# Patient Record
Sex: FEMALE | Race: WHITE | Employment: FULL TIME | URBAN - METROPOLITAN AREA
[De-identification: names, ages, dates, MRNs, and addresses within clinical notes are randomized per-mention and may not be internally consistent; named-entity substitution may affect disease eponyms.]

---

## 2017-05-01 ENCOUNTER — TELEPHONE (OUTPATIENT)
Dept: OBGYN | Facility: CLINIC | Age: 32
End: 2017-05-01

## 2017-05-02 NOTE — TELEPHONE ENCOUNTER
Pt would like to est first pregnancy care. Please advise.     Thank You,    Central Scheduler  Cheyenne ROSADO

## 2017-05-02 NOTE — TELEPHONE ENCOUNTER
Cha, can you call this pt and help her make NPN Nurse and md appts (if she is under 24 weeks pregnant).     Thanks.   Lety

## 2017-05-24 ENCOUNTER — PRENATAL OFFICE VISIT (OUTPATIENT)
Dept: FAMILY MEDICINE | Facility: CLINIC | Age: 32
End: 2017-05-24
Payer: COMMERCIAL

## 2017-05-24 ENCOUNTER — PRENATAL OFFICE VISIT (OUTPATIENT)
Dept: NURSING | Facility: CLINIC | Age: 32
End: 2017-05-24
Payer: COMMERCIAL

## 2017-05-24 VITALS
RESPIRATION RATE: 16 BRPM | OXYGEN SATURATION: 95 % | SYSTOLIC BLOOD PRESSURE: 104 MMHG | HEART RATE: 83 BPM | WEIGHT: 180.9 LBS | DIASTOLIC BLOOD PRESSURE: 73 MMHG | BODY MASS INDEX: 31.05 KG/M2

## 2017-05-24 VITALS
HEART RATE: 83 BPM | RESPIRATION RATE: 16 BRPM | TEMPERATURE: 97.6 F | HEIGHT: 64 IN | SYSTOLIC BLOOD PRESSURE: 104 MMHG | BODY MASS INDEX: 30.88 KG/M2 | WEIGHT: 180.9 LBS | DIASTOLIC BLOOD PRESSURE: 73 MMHG

## 2017-05-24 DIAGNOSIS — N91.2 AMENORRHEA: Primary | ICD-10-CM

## 2017-05-24 DIAGNOSIS — A60.00 GENITAL HERPES SIMPLEX, UNSPECIFIED SITE: ICD-10-CM

## 2017-05-24 DIAGNOSIS — Z34.01 PREGNANCY, FIRST, OBSTETRICAL CARE, FIRST TRIMESTER: Primary | ICD-10-CM

## 2017-05-24 LAB
ALBUMIN UR-MCNC: NEGATIVE MG/DL
APPEARANCE UR: CLEAR
BETA HCG QUAL IFA URINE: POSITIVE
BILIRUB UR QL STRIP: NEGATIVE
COLOR UR AUTO: YELLOW
GLUCOSE UR STRIP-MCNC: NEGATIVE MG/DL
HGB UR QL STRIP: NEGATIVE
KETONES UR STRIP-MCNC: NEGATIVE MG/DL
LEUKOCYTE ESTERASE UR QL STRIP: NEGATIVE
NITRATE UR QL: NEGATIVE
PH UR STRIP: 6.5 PH (ref 5–7)
SP GR UR STRIP: 1.02 (ref 1–1.03)
URN SPEC COLLECT METH UR: NORMAL
UROBILINOGEN UR STRIP-ACNC: 0.2 EU/DL (ref 0.2–1)

## 2017-05-24 PROCEDURE — 87340 HEPATITIS B SURFACE AG IA: CPT | Performed by: FAMILY MEDICINE

## 2017-05-24 PROCEDURE — 87086 URINE CULTURE/COLONY COUNT: CPT | Performed by: FAMILY MEDICINE

## 2017-05-24 PROCEDURE — 86762 RUBELLA ANTIBODY: CPT | Performed by: FAMILY MEDICINE

## 2017-05-24 PROCEDURE — 36415 COLL VENOUS BLD VENIPUNCTURE: CPT | Performed by: FAMILY MEDICINE

## 2017-05-24 PROCEDURE — 87389 HIV-1 AG W/HIV-1&-2 AB AG IA: CPT | Performed by: FAMILY MEDICINE

## 2017-05-24 PROCEDURE — 81003 URINALYSIS AUTO W/O SCOPE: CPT | Performed by: FAMILY MEDICINE

## 2017-05-24 PROCEDURE — 99207 ZZC FIRST OB VISIT: CPT | Performed by: FAMILY MEDICINE

## 2017-05-24 PROCEDURE — 86900 BLOOD TYPING SEROLOGIC ABO: CPT | Performed by: FAMILY MEDICINE

## 2017-05-24 PROCEDURE — 84703 CHORIONIC GONADOTROPIN ASSAY: CPT | Performed by: FAMILY MEDICINE

## 2017-05-24 PROCEDURE — 87591 N.GONORRHOEAE DNA AMP PROB: CPT | Performed by: FAMILY MEDICINE

## 2017-05-24 PROCEDURE — 87491 CHLMYD TRACH DNA AMP PROBE: CPT | Performed by: FAMILY MEDICINE

## 2017-05-24 PROCEDURE — 86901 BLOOD TYPING SEROLOGIC RH(D): CPT | Performed by: FAMILY MEDICINE

## 2017-05-24 PROCEDURE — 86850 RBC ANTIBODY SCREEN: CPT | Performed by: FAMILY MEDICINE

## 2017-05-24 PROCEDURE — 85027 COMPLETE CBC AUTOMATED: CPT | Performed by: FAMILY MEDICINE

## 2017-05-24 PROCEDURE — 86780 TREPONEMA PALLIDUM: CPT | Performed by: FAMILY MEDICINE

## 2017-05-24 PROCEDURE — 99207 ZZC NO CHARGE NURSE ONLY: CPT

## 2017-05-24 RX ORDER — CETIRIZINE HYDROCHLORIDE 10 MG/1
10 TABLET ORAL EVERY EVENING
Qty: 30 TABLET | Refills: 1 | COMMUNITY
Start: 2017-05-24

## 2017-05-24 RX ORDER — FLUTICASONE PROPIONATE 50 MCG
1-2 SPRAY, SUSPENSION (ML) NASAL DAILY
Qty: 3 BOTTLE | Refills: 11 | COMMUNITY
Start: 2017-05-24 | End: 2023-05-08

## 2017-05-24 RX ORDER — PRENATAL VIT/IRON FUM/FOLIC AC 27MG-0.8MG
1 TABLET ORAL DAILY
Qty: 100 TABLET | Refills: 3 | COMMUNITY
Start: 2017-05-24 | End: 2023-05-08

## 2017-05-24 RX ORDER — SERTRALINE HYDROCHLORIDE 100 MG/1
50 TABLET, FILM COATED ORAL DAILY
Qty: 90 TABLET | Refills: 1 | COMMUNITY
Start: 2017-05-24 | End: 2023-05-08

## 2017-05-24 RX ORDER — RIZATRIPTAN BENZOATE 5 MG/1
5-10 TABLET ORAL
Qty: 18 TABLET | Refills: 3 | COMMUNITY
Start: 2017-05-24 | End: 2017-05-24 | Stop reason: ALTCHOICE

## 2017-05-24 NOTE — PROGRESS NOTES
Subjective:  Luba Vargas is a 31 year old female  who presents today for her first prenatal visit.  She has never had an abnormal PAP smear.  Her LMP was 12/21/17.  She has had trouble with nausea.  This is her 1st pregnancy.  She is a G 1 P 0.    Her EDC is 12/21/17.  She has the following questions:  Wondering about herpes and need for C section.   Has question about sertraline.       Current Outpatient Prescriptions   Medication Sig Dispense Refill     cetirizine (ZYRTEC) 10 MG tablet Take 1 tablet (10 mg) by mouth every evening 30 tablet 1     Prenatal Vit-Fe Fumarate-FA (PRENATAL MULTIVITAMIN  PLUS IRON) 27-0.8 MG TABS per tablet Take 1 tablet by mouth daily 100 tablet 3     fluticasone (FLONASE) 50 MCG/ACT spray Spray 1-2 sprays into both nostrils daily 3 Bottle 11     sertraline (ZOLOFT) 100 MG tablet Take 1 tablet (100 mg) by mouth daily 90 tablet 1     albuterol 90 MCG/ACT inhaler Inhale 2 puffs into the lungs every 4 hours as needed.         Past Medical History:   Diagnosis Date     Abnormal Pap smear of cervix 2011    MODERATE DYSPLASIA OF CERVIX     Asthma      Kidney stones 2011 and 2012     Major depressive disorder      Migraine      Varicella without mention of complication     MUMPS ON 2007       Past Surgical History:   Procedure Laterality Date     CONIZATION LEEP  10/25/2011    Procedure:CONIZATION LEEP; Conization Loop Electrosurgical Excision, Laser to Left Vaginal Fornix (VB Rags); Surgeon:LYNN KHAN; Location:[facility]     LASER CO2 VAGINA  10/25/2011    Procedure:LASER CO2 VAGINA; Surgeon:LYNN KHAN; Location:[facility]     MYRINGOTOMY, INSERT TUBE, COMBINED Right 10/2016    2 SETS     TONSILLECTOMY      AT 12 Y/O       Family History   Problem Relation Age of Onset     DIABETES Mother      Thyroid Disease Father      Thyroid Disease Sister      DIABETES Maternal Grandmother      Lung Cancer Maternal Grandfather        Social History   Substance Use Topics     Smoking status:  "Never Smoker     Smokeless tobacco: Not on file     Alcohol use No      Comment: 1 DRINK PER week when not pregnant       Objective:  Blood pressure 104/73, pulse 83, temperature 97.6  F (36.4  C), temperature source Oral, resp. rate 16, height 5' 4\" (1.626 m), weight 180 lb 14.4 oz (82.1 kg), last menstrual period 03/16/2017.  General appearance: Healthy.  Mental Status: cooperative, normal affect, no gross thought process defects.  HEENT:   Ears- Normal external canals and TMs  Eyes- PERRL, EOM's intact, fundi benign, sharp disc margins.  Throat- Normal  Nodes- Negative  Thyroid: Normal to palpation, no enlargement or nodules noted.  Breasts: Symmetric without mass tenderness or discharge.  Axillary nodes negative.  Lungs: Clear to auscultation bilaterally  Heart.: Normal rate and rhythm.  No murmurs, clicks or gallops.  Pulses:    R-4/4, PT-4/4, DP-4/4  Abdomen: BS active. Soft, non-tender, no masses or organomegaly.  Aorta normal. No bruits.   bimanual exam  8 week size uterus, no adenexal mass or tenderness.  Doptone NONE.  Extremities: Normal without edema  Reflexes:  Symmetric bilaterally and 2 + at the patella  Skin: Clear and free of rash.    Assessment:  1. Luba Vargas is a healthy 31 year old female here for a first prenatal visit.  2. Patient and her  desire first trimester screening  3. Patient with hx of genital herpes - NO outbreak for over 3 years  4. Allergies - may continue flonase use  5. Depression - will have her taper down on her dose of sertraline        Plan:  1. A Pap smear was NOT obtained  2. Prenatal labs ordered - see epicare orders  3. First trimester screening ordered through [facility]  4. fetal anatomy survey to be done around 20 weeks gestation  5. .will come down to 75 mg of sertraline daily for a few months and then 50 mg per day.   May try to come off sertraline for 3rd trimester but will follow for sx of depression  6. May use flonase for seasonal allergies  7.  Patient " is to follow-up in 4 weeks and as needed.

## 2017-05-24 NOTE — MR AVS SNAPSHOT
"              After Visit Summary   2017    Luba Vargas    MRN: [id number]           Patient Information     Date Of Birth          1985        Visit Information        Provider Department      2017 2:00 PM CR OB CLINIC [facility]        Today's Diagnoses     Amenorrhea    -  1       Follow-ups after your visit        Who to contact     If you have questions or need follow up information about today's clinic visit or your schedule please contact [facility] directly at [phone].  Normal or non-critical lab and imaging results will be communicated to you by MyChart, letter or phone within 4 business days after the clinic has received the results. If you do not hear from us within 7 days, please contact the clinic through [employer] or phone. If you have a critical or abnormal lab result, we will notify you by phone as soon as possible.  Submit refill requests through [employer] or call your pharmacy and they will forward the refill request to us. Please allow 3 business days for your refill to be completed.          Additional Information About Your Visit        MyChart Information     [employer] lets you send messages to your doctor, view your test results, renew your prescriptions, schedule appointments and more. To sign up, go to [email or web address] . Click on \"Log in\" on the left side of the screen, which will take you to the Welcome page. Then click on \"Sign up Now\" on the right side of the page.     You will be asked to enter the access code listed below, as well as some personal information. Please follow the directions to create your username and password.     Your access code is: [email or web address]  Expires: 2017  3:26 PM     Your access code will  in 90 days. If you need help or a new code, please call your [facility] or [phone].        Care EveryWhere ID     This is your Care EveryWhere ID. This could be used by other organizations to " access your [place] medical records  [id number]        Your Vitals Were     Pulse Respirations Last Period Pulse Oximetry BMI (Body Mass Index)       83 16 03/16/2017 (Exact Date) 95% 31.05 kg/m2        Blood Pressure from Last 3 Encounters:   05/24/17 104/73   05/24/17 104/73   10/25/11 113/69    Weight from Last 3 Encounters:   05/24/17 180 lb 14.4 oz (82.1 kg)   05/24/17 180 lb 14.4 oz (82.1 kg)   10/25/11 165 lb 9.1 oz (75.1 kg)              We Performed the Following     Beta HCG qual IFA urine        Primary Care Provider Office Phone # Fax #    Heide Blackman [phone]       [facility] [address]  [place] [address]        Thank you!     Thank you for choosing [facility]  for your care. Our goal is always to provide you with excellent care. Hearing back from our patients is one way we can continue to improve our services. Please take a few minutes to complete the written survey that you may receive in the mail after your visit with us. Thank you!             Your Updated Medication List - Protect others around you: Learn how to safely use, store and throw away your medicines at www.disposemymeds.org.          This list is accurate as of: 5/24/17  3:26 PM.  Always use your most recent med list.                   Brand Name Dispense Instructions for use    albuterol 90 MCG/ACT inhaler      Inhale 2 puffs into the lungs every 4 hours as needed.       cetirizine 10 MG tablet    zyrTEC    30 tablet    Take 1 tablet (10 mg) by mouth every evening       fluticasone 50 MCG/ACT spray    FLONASE    3 Bottle    Spray 1-2 sprays into both nostrils daily       MAXALT 5 MG tablet   Generic drug:  rizatriptan     18 tablet    Take 1-2 tablets (5-10 mg) by mouth at onset of headache for migraine May repeat in 2 hours. Max 6 tablets/24 hours.       prenatal multivitamin  plus iron 27-0.8 MG Tabs per tablet     100 tablet    Take 1 tablet by mouth daily        sertraline 100 MG tablet    ZOLOFT    90 tablet    Take 1 tablet (100 mg) by mouth daily

## 2017-05-24 NOTE — MR AVS SNAPSHOT
After Visit Summary   5/24/2017    Luba Vargas    MRN: [id number]           Patient Information     Date Of Birth          1985        Visit Information        Provider Department      5/24/2017 2:45 PM Landy Browning MD [facility]        Today's Diagnoses     Pregnancy, first, obstetrical care, first trimester    -  1    Genital herpes simplex, unspecified site          Care Instructions        Fetal development begins soon after conception. Find out how your baby grows and develops during the first trimester.    You're pregnant. Congratulations! You'll undoubtedly spend the months ahead wondering how your baby is growing and developing. What does your baby look like? How big is he or she? When will you feel the first kick?  Fetal development typically follows a predictable course. Find out what happens during the first trimester by checking out this weekly calendar of events. Keep in mind that measurements are approximate.  It might seem strange, but you're not actually pregnant the first week or two of the time allotted to your pregnancy. Yes, you read that correctly!  Conception typically occurs about two weeks after your last period begins. To calculate your due date, your health care provider will count ahead 40 weeks from the start of your last period. This means your period is counted as part of your pregnancy -- even though you weren't pregnant at the time.  The sperm and egg unite in one of your fallopian tubes to form a one-celled entity called a zygote. If more than one egg is released and fertilized, you might have multiple zygotes.  The zygote typically has 46 chromosomes -- 23 from you and 23 from the father. These chromosomes help determine your baby's sex, traits such as eye and hair color, and, to some extent, personality and intelligence.  Soon after fertilization, the zygote travels down the fallopian tube toward the uterus. At the same time, it will  begin dividing to form a cluster of cells resembling a tiny raspberry -- a morula.  By the time it reaches the uterus, the rapidly dividing ball of cells -- now known as a blastocyst -- has  into two sections.  The inner group of cells will become the embryo. The outer group will become the cells that nourish and protect it. On contact, the blastocyst will burrow into the uterine wall for nourishment. This process is called implantation.  The placenta, which will nourish your baby throughout the pregnancy, also begins to form.   The fifth week of pregnancy, or the third week after conception, marks the beginning of the embryonic period. This is when the baby's brain, spinal cord, heart and other organs begin to form.  The embryo is now made of three layers. The top layer -- the ectoderm -- will give rise to your baby's outermost layer of skin, central and peripheral nervous systems, eyes, inner ears, and many connective tissues.  Your baby's heart and a primitive circulatory system will form in the middle layer of cells -- the mesoderm. This layer of cells will also serve as the foundation for your baby's bones, muscles, kidneys and much of the reproductive system.  The inner layer of cells -- the endoderm -- will become a simple tube lined with mucous membranes. Your baby's lungs, intestines and bladder will develop here.  By the end of this week, your baby is likely about the size of the tip of a pen.  Growth is rapid this week. Just four weeks after conception, the neural tube along your baby's back is closing and your baby's heart is pumping blood.  Basic facial features will begin to appear, including passageways that will make up the inner ears and arches that will contribute to the jaw. Your baby's body begins to take on a C-shaped curvature. Small buds will soon become arms and legs.  Seven weeks into your pregnancy, or five weeks after conception, your baby's brain and face are rapidly developing.  Tiny nostrils become visible, and the eye lenses begin to form. The arm buds that sprouted last week now take on the shape of paddles.  By the end of this week, your baby might be a little bigger than the top of a pencil eraser.  Eight weeks into your pregnancy, or six weeks after conception, your baby's arms and legs are growing longer, and fingers have begun to form. The shell-shaped parts of your baby's ears also are forming, and your baby's eyes are visible. The upper lip and nose have formed. The trunk of your baby's body is beginning to straighten.  By the end of this week, your baby might be about 1/2 inch (11 to 14 millimeters) long.  In the ninth week of pregnancy, or seven weeks after conception, your baby's arms grow, develop bones and bend at the elbows. Toes form, and your baby's eyelids and ears continue developing.   By the end of this week, your baby might be about 3/4 inch (20 millimeters) long.   By the 10th week of pregnancy, or eight weeks after conception, your baby's head has become more round. The neck begins to develop, and your baby's eyelids begin to close to protect his or her developing eyes.   At the beginning of the 11th week of pregnancy, or the ninth week after conception, your baby's head still makes up about half of its length. However, your baby's body is about to catch up, growing rapidly in the coming weeks.  Your baby is now officially described as a fetus. This week your baby's eyes are widely , the eyelids fused and the ears low set. Red blood cells are beginning to form in your baby's liver. By the end of this week, your baby's external genitalia will start developing into a penis or clitoris and labia majora.  By now your baby might measure about 2 inches (50 millimeters) long from crown to rump and weigh almost 1/3 ounce (8 grams).  Twelve weeks into your pregnancy, or 10 weeks after conception, your baby is developing fingernails. Your baby's face now has a human  profile.  By now your baby might be about 2 1/2 inches (60 millimeters) long from crown to rump and weigh about 1/2 ounce (14 grams).            Follow-ups after your visit        Additional Services     MAT FETAL MED CTR REFERRAL-PREGNANCY       >> Patient may proceed with recommendations for further testing as directed by the Maternal Fetal Medicine Specialist >>    >> If requesting Fetal Echo: MFM will determine appropriate location for exam due to indication.    >> If requesting Lung Maturity Amnio:  If results indicate fetal lung maturity, induction or C/S is recommended within 36 hours.  Please schedule accordingly.     Dear Patient:   Please be aware that coverage of these services is subject to the terms and limitations of your health insurance plan.  Call member services at your health plan with any benefit or coverage questions.      Please bring the following to your appointment:    >>  Any x-rays, CTs or MRIs which have been performed.  Contact the facility where they were done to arrange for  prior to your scheduled appointment.  Any new CT, MRI or other procedures ordered by your specialist must be performed at a [place] facility or coordinated by your clinic's referral office.  >>  List of current medications   >>  This referral request   >>  Any documents/labs given to you for this referral                  Follow-up notes from your care team     Return in about 4 weeks (around 6/21/2017).      Future tests that were ordered for you today     Open Future Orders        Priority Expected Expires Ordered    US OB > 14 Weeks Complete Single Routine  5/24/2018 5/24/2017            Who to contact     If you have questions or need follow up information about today's clinic visit or your schedule please contact [facility] directly at [phone].  Normal or non-critical lab and imaging results will be communicated to you by MyChart, letter or phone within 4 business days after the  "clinic has received the results. If you do not hear from us within 7 days, please contact the clinic through [facility] or phone. If you have a critical or abnormal lab result, we will notify you by phone as soon as possible.  Submit refill requests through [employer] or call your pharmacy and they will forward the refill request to us. Please allow 3 business days for your refill to be completed.          Additional Information About Your Visit        [employer] Information     [facility] lets you send messages to your doctor, view your test results, renew your prescriptions, schedule appointments and more. To sign up, go to [email or web address] . Click on \"Log in\" on the left side of the screen, which will take you to the Welcome page. Then click on \"Sign up Now\" on the right side of the page.     You will be asked to enter the access code listed below, as well as some personal information. Please follow the directions to create your username and password.     Your access code is: [email or web address]  Expires: 2017  3:26 PM     Your access code will  in 90 days. If you need help or a new code, please call your [facility] clinic or [phone].        Care EveryWhere ID     This is your Care EveryWhere ID. This could be used by other organizations to access your [facility] medical records  [id number]        Your Vitals Were     Pulse Temperature Respirations Height Last Period BMI (Body Mass Index)    83 97.6  F (36.4  C) (Oral) 16 5' 4\" (1.626 m) 2017 (Exact Date) 31.05 kg/m2       Blood Pressure from Last 3 Encounters:   17 104/73   17 104/73   10/25/11 113/69    Weight from Last 3 Encounters:   17 180 lb 14.4 oz (82.1 kg)   17 180 lb 14.4 oz (82.1 kg)   10/25/11 165 lb 9.1 oz (75.1 kg)              We Performed the Following     *UA reflex to Microscopic     ABO/Rh type and screen     Anti Treponema     CBC with platelets     Chlamydia trachomatis PCR     Hepatitis B surface antigen     " HIV Antigen Antibody Combo     MAT FETAL MED CTR REFERRAL-PREGNANCY     Neisseria gonorrhoeae PCR     Rubella Antibody IgG Quantitative     Urine Culture Aerobic Bacterial          Today's Medication Changes          These changes are accurate as of: 5/24/17  4:02 PM.  If you have any questions, ask your nurse or doctor.               Stop taking these medicines if you haven't already. Please contact your care team if you have questions.     MAXALT 5 MG tablet   Generic drug:  rizatriptan   Stopped by:  Landy Browning MD                    Primary Care Provider Office Phone # Fax #    Heide Blackman [phone]       [facility] [address]  [facility] [address]        Thank you!     Thank you for choosing [facility]  for your care. Our goal is always to provide you with excellent care. Hearing back from our patients is one way we can continue to improve our services. Please take a few minutes to complete the written survey that you may receive in the mail after your visit with us. Thank you!             Your Updated Medication List - Protect others around you: Learn how to safely use, store and throw away your medicines at www.disposemymeds.org.          This list is accurate as of: 5/24/17  4:02 PM.  Always use your most recent med list.                   Brand Name Dispense Instructions for use    albuterol 90 MCG/ACT inhaler      Inhale 2 puffs into the lungs every 4 hours as needed.       cetirizine 10 MG tablet    zyrTEC    30 tablet    Take 1 tablet (10 mg) by mouth every evening       fluticasone 50 MCG/ACT spray    FLONASE    3 Bottle    Spray 1-2 sprays into both nostrils daily       prenatal multivitamin  plus iron 27-0.8 MG Tabs per tablet     100 tablet    Take 1 tablet by mouth daily       sertraline 100 MG tablet    ZOLOFT    90 tablet    Take 1 tablet (100 mg) by mouth daily

## 2017-05-24 NOTE — PATIENT INSTRUCTIONS
Fetal development begins soon after conception. Find out how your baby grows and develops during the first trimester.    You're pregnant. Congratulations! You'll undoubtedly spend the months ahead wondering how your baby is growing and developing. What does your baby look like? How big is he or she? When will you feel the first kick?  Fetal development typically follows a predictable course. Find out what happens during the first trimester by checking out this weekly calendar of events. Keep in mind that measurements are approximate.  It might seem strange, but you're not actually pregnant the first week or two of the time allotted to your pregnancy. Yes, you read that correctly!  Conception typically occurs about two weeks after your last period begins. To calculate your due date, your health care provider will count ahead 40 weeks from the start of your last period. This means your period is counted as part of your pregnancy -- even though you weren't pregnant at the time.  The sperm and egg unite in one of your fallopian tubes to form a one-celled entity called a zygote. If more than one egg is released and fertilized, you might have multiple zygotes.  The zygote typically has 46 chromosomes -- 23 from you and 23 from the father. These chromosomes help determine your baby's sex, traits such as eye and hair color, and, to some extent, personality and intelligence.  Soon after fertilization, the zygote travels down the fallopian tube toward the uterus. At the same time, it will begin dividing to form a cluster of cells resembling a tiny raspberry -- a morula.  By the time it reaches the uterus, the rapidly dividing ball of cells -- now known as a blastocyst -- has  into two sections.  The inner group of cells will become the embryo. The outer group will become the cells that nourish and protect it. On contact, the blastocyst will burrow into the uterine wall for nourishment. This process is called  implantation.  The placenta, which will nourish your baby throughout the pregnancy, also begins to form.   The fifth week of pregnancy, or the third week after conception, marks the beginning of the embryonic period. This is when the baby's brain, spinal cord, heart and other organs begin to form.  The embryo is now made of three layers. The top layer -- the ectoderm -- will give rise to your baby's outermost layer of skin, central and peripheral nervous systems, eyes, inner ears, and many connective tissues.  Your baby's heart and a primitive circulatory system will form in the middle layer of cells -- the mesoderm. This layer of cells will also serve as the foundation for your baby's bones, muscles, kidneys and much of the reproductive system.  The inner layer of cells -- the endoderm -- will become a simple tube lined with mucous membranes. Your baby's lungs, intestines and bladder will develop here.  By the end of this week, your baby is likely about the size of the tip of a pen.  Growth is rapid this week. Just four weeks after conception, the neural tube along your baby's back is closing and your baby's heart is pumping blood.  Basic facial features will begin to appear, including passageways that will make up the inner ears and arches that will contribute to the jaw. Your baby's body begins to take on a C-shaped curvature. Small buds will soon become arms and legs.  Seven weeks into your pregnancy, or five weeks after conception, your baby's brain and face are rapidly developing. Tiny nostrils become visible, and the eye lenses begin to form. The arm buds that sprouted last week now take on the shape of paddles.  By the end of this week, your baby might be a little bigger than the top of a pencil eraser.  Eight weeks into your pregnancy, or six weeks after conception, your baby's arms and legs are growing longer, and fingers have begun to form. The shell-shaped parts of your baby's ears also are forming, and  your baby's eyes are visible. The upper lip and nose have formed. The trunk of your baby's body is beginning to straighten.  By the end of this week, your baby might be about 1/2 inch (11 to 14 millimeters) long.  In the ninth week of pregnancy, or seven weeks after conception, your baby's arms grow, develop bones and bend at the elbows. Toes form, and your baby's eyelids and ears continue developing.   By the end of this week, your baby might be about 3/4 inch (20 millimeters) long.   By the 10th week of pregnancy, or eight weeks after conception, your baby's head has become more round. The neck begins to develop, and your baby's eyelids begin to close to protect his or her developing eyes.   At the beginning of the 11th week of pregnancy, or the ninth week after conception, your baby's head still makes up about half of its length. However, your baby's body is about to catch up, growing rapidly in the coming weeks.  Your baby is now officially described as a fetus. This week your baby's eyes are widely , the eyelids fused and the ears low set. Red blood cells are beginning to form in your baby's liver. By the end of this week, your baby's external genitalia will start developing into a penis or clitoris and labia majora.  By now your baby might measure about 2 inches (50 millimeters) long from crown to rump and weigh almost 1/3 ounce (8 grams).  Twelve weeks into your pregnancy, or 10 weeks after conception, your baby is developing fingernails. Your baby's face now has a human profile.  By now your baby might be about 2 1/2 inches (60 millimeters) long from crown to rump and weigh about 1/2 ounce (14 grams).

## 2017-05-24 NOTE — NURSING NOTE
Subjective: 31 year old patient presents for pregnancy confirmation. Her last menstrual period was 3/16/17. She has had a positive home test This is her 1st pregnancy, G1, P0. She has had previous n/a. She would like to be seen Dr. Allen for her prenatal care. She has started prenatal vitamins.      Exam:  General Appearance: appears well.  Her urine pregnancy test is positive.    Assessment: positive pregnancy confirmation.    Plan: Patient has an EDC of 12/21/17. Is currently 9w6d weeks along. She is scheduled for her first OB appointment with Dr. Allen today after this appt. Risk assessment done. We discussed basic pregnancy care, diet, exercise and prenatal vitamins.     Pre Term Labor Risk Assessment   1. Is the patient's age <18 or >40? no  2. Does the patient have a BMI < 18.5? no  3. If previous pregnancy, was delivery within previous 6 months? no  4. Have you ever been diagnosed with pyelonephritis? no  5. Have you ever delivered a baby prior to 37 weeks gestation? no  6. Have you ever been told you have a uterine anomaly? no  7. Do you currently have uterine fibroids? no  8. Have you had any gynecological surgical procedures such as cervical conization, a LEEP procedure, laser treatment or cryosurgery of the cervix? yes  9. Did your mother take ROSA or any other hormones when she was pregnant with you? no  10. Did conception for this pregnancy occur via In Vitro Fertilization? no  11. Are you carrying twins? no  12. Do you currently use tobacco products? no  13. Prior to this pregnancy, how much alcohol did you drink each week? (if >7/week= high risk..)  1 glass of wine  14. Since you learned you were pregnant, how much beer, wine or hard liquor did you drink per week? (anything >0 = high risk) none  15. Prior to learning you are pregnant, did you use any of the following: marijuana, prescription narcotics, speed, cocaine, heroin, hallucinogens or other drugs? (any use within 1 yr = high risk)   no  16. Since you learned you are pregnant, have you used any of the following: marijuana, prescription narcotics, speed, cocaine, heroin, hallucinogens or other drugs? (any use = high risk)  no  17. Do you have a history of chemical dependency (yes=high risk)  no  18. Have you ever been treated for more than 1 Urinary Tract Infection during this pregnancy? no  19. Do you have a history of Depression, Bi-polar disorder, anxiety, schizophrenia or other mental illness?  yes  20. Are you currently being treated for depression, bi-polar disorder, anxiety, schizophrenia or other mental illness? yes  21. Have you had Chlamydia or gonorrhea during this pregnancy? no  22. Periodontal disease (gum disease)? no  23. Has anyone hit, slapped, kicked or otherwise hurt you? no  24. Has anyone forced you to have sex when you didn't want to? no  Summary:   Patient is not high risk for  Labor  Martita Posadas RN

## 2017-05-24 NOTE — NURSING NOTE
"Chief Complaint   Patient presents with     Prenatal Care     9 weeks 6 days, pt thinks she will be 11 weeks tomorrow.        Initial /73 (BP Location: Left arm, Patient Position: Chair, Cuff Size: Adult Regular)  Pulse 83  Temp 97.6  F (36.4  C) (Oral)  Resp 16  Ht 5' 4\" (1.626 m)  Wt 180 lb 14.4 oz (82.1 kg)  LMP 03/16/2017 (Exact Date)  BMI 31.05 kg/m2 Estimated body mass index is 31.05 kg/(m^2) as calculated from the following:    Height as of this encounter: 5' 4\" (1.626 m).    Weight as of this encounter: 180 lb 14.4 oz (82.1 kg).  Medication Reconciliation: complete     Corey Thompson CMA      "

## 2017-05-25 ENCOUNTER — TELEPHONE (OUTPATIENT)
Dept: FAMILY MEDICINE | Facility: CLINIC | Age: 32
End: 2017-05-25

## 2017-05-25 LAB
ABO + RH BLD: NORMAL
ABO + RH BLD: NORMAL
BLD GP AB SCN SERPL QL: NORMAL
BLOOD BANK CMNT PATIENT-IMP: NORMAL
ERYTHROCYTE [DISTWIDTH] IN BLOOD BY AUTOMATED COUNT: 15.3 % (ref 10–15)
HCT VFR BLD AUTO: 38.9 % (ref 35–47)
HGB BLD-MCNC: 13.3 G/DL (ref 11.7–15.7)
MCH RBC QN AUTO: 29.8 PG (ref 26.5–33)
MCHC RBC AUTO-ENTMCNC: 34.2 G/DL (ref 31.5–36.5)
MCV RBC AUTO: 87 FL (ref 78–100)
PLATELET # BLD AUTO: 279 10E9/L (ref 150–450)
RBC # BLD AUTO: 4.47 10E12/L (ref 3.8–5.2)
SPECIMEN EXP DATE BLD: NORMAL
WBC # BLD AUTO: 11.5 10E9/L (ref 4–11)

## 2017-05-25 NOTE — TELEPHONE ENCOUNTER
Roldanxochitl called hospital, reached out to pt to schedule US and pt stated she is going to be getting her prenatal care from other provider  Will not be following up here in [facility]    Anisha Rosa RN, BS  Clinical Nurse Triage.

## 2017-05-26 LAB
BACTERIA SPEC CULT: NORMAL
C TRACH DNA SPEC QL NAA+PROBE: NORMAL
HBV SURFACE AG SERPL QL IA: NONREACTIVE
HIV 1+2 AB+HIV1 P24 AG SERPL QL IA: NORMAL
MICRO REPORT STATUS: NORMAL
N GONORRHOEA DNA SPEC QL NAA+PROBE: NORMAL
RUBV IGG SERPL IA-ACNC: 16 IU/ML
SPECIMEN SOURCE: NORMAL
T PALLIDUM IGG+IGM SER QL: NEGATIVE

## 2017-05-31 PROBLEM — Z67.40 BLOOD TYPE O+: Status: ACTIVE | Noted: 2017-05-31

## 2017-06-13 ENCOUNTER — HOSPITAL ENCOUNTER (EMERGENCY)
Facility: CLINIC | Age: 32
Discharge: HOME | End: 2017-06-13
Attending: EMERGENCY MEDICINE | Admitting: EMERGENCY MEDICINE
Payer: COMMERCIAL

## 2017-06-13 VITALS
DIASTOLIC BLOOD PRESSURE: 58 MMHG | TEMPERATURE: 98 F | HEART RATE: 91 BPM | RESPIRATION RATE: 18 BRPM | SYSTOLIC BLOOD PRESSURE: 122 MMHG | OXYGEN SATURATION: 99 %

## 2017-06-13 DIAGNOSIS — G43.809 OTHER MIGRAINE WITHOUT STATUS MIGRAINOSUS, NOT INTRACTABLE: ICD-10-CM

## 2017-06-13 DIAGNOSIS — Z3A.11 11 WEEKS GESTATION OF PREGNANCY: ICD-10-CM

## 2017-06-13 LAB
ANION GAP SERPL CALCULATED.3IONS-SCNC: 9 MMOL/L (ref 3–14)
BUN SERPL-MCNC: 8 MG/DL (ref 7–30)
CALCIUM SERPL-MCNC: 8.8 MG/DL (ref 8.5–10.1)
CHLORIDE SERPL-SCNC: 103 MMOL/L (ref 94–109)
CO2 SERPL-SCNC: 24 MMOL/L (ref 20–32)
CREAT SERPL-MCNC: 0.51 MG/DL (ref 0.52–1.04)
GFR SERPL CREATININE-BSD FRML MDRD: ABNORMAL ML/MIN/1.7M2
GLUCOSE SERPL-MCNC: 87 MG/DL (ref 70–99)
POTASSIUM SERPL-SCNC: 3.6 MMOL/L (ref 3.4–5.3)
SODIUM SERPL-SCNC: 136 MMOL/L (ref 133–144)

## 2017-06-13 PROCEDURE — 96374 THER/PROPH/DIAG INJ IV PUSH: CPT

## 2017-06-13 PROCEDURE — 96375 TX/PRO/DX INJ NEW DRUG ADDON: CPT

## 2017-06-13 PROCEDURE — 25000128 H RX IP 250 OP 636: Performed by: EMERGENCY MEDICINE

## 2017-06-13 PROCEDURE — 99284 EMERGENCY DEPT VISIT MOD MDM: CPT | Mod: 25

## 2017-06-13 PROCEDURE — 96361 HYDRATE IV INFUSION ADD-ON: CPT

## 2017-06-13 PROCEDURE — 80048 BASIC METABOLIC PNL TOTAL CA: CPT | Performed by: EMERGENCY MEDICINE

## 2017-06-13 RX ORDER — LIDOCAINE 40 MG/G
CREAM TOPICAL
Status: DISCONTINUED | OUTPATIENT
Start: 2017-06-13 | End: 2017-06-14 | Stop reason: HOSPADM

## 2017-06-13 RX ORDER — HYDROMORPHONE HYDROCHLORIDE 1 MG/ML
0.5 INJECTION, SOLUTION INTRAMUSCULAR; INTRAVENOUS; SUBCUTANEOUS
Status: DISCONTINUED | OUTPATIENT
Start: 2017-06-13 | End: 2017-06-14 | Stop reason: HOSPADM

## 2017-06-13 RX ORDER — SODIUM CHLORIDE 9 MG/ML
1000 INJECTION, SOLUTION INTRAVENOUS CONTINUOUS
Status: DISCONTINUED | OUTPATIENT
Start: 2017-06-13 | End: 2017-06-14 | Stop reason: HOSPADM

## 2017-06-13 RX ORDER — METOCLOPRAMIDE HYDROCHLORIDE 5 MG/ML
10 INJECTION INTRAMUSCULAR; INTRAVENOUS ONCE
Status: COMPLETED | OUTPATIENT
Start: 2017-06-13 | End: 2017-06-13

## 2017-06-13 RX ORDER — DIPHENHYDRAMINE HYDROCHLORIDE 50 MG/ML
25 INJECTION INTRAMUSCULAR; INTRAVENOUS ONCE
Status: COMPLETED | OUTPATIENT
Start: 2017-06-13 | End: 2017-06-13

## 2017-06-13 RX ADMIN — DIPHENHYDRAMINE HYDROCHLORIDE 25 MG: 50 INJECTION, SOLUTION INTRAMUSCULAR; INTRAVENOUS at 21:20

## 2017-06-13 RX ADMIN — SODIUM CHLORIDE 1000 ML: 9 INJECTION, SOLUTION INTRAVENOUS at 21:20

## 2017-06-13 RX ADMIN — METOCLOPRAMIDE 10 MG: 5 INJECTION, SOLUTION INTRAMUSCULAR; INTRAVENOUS at 21:20

## 2017-06-13 ASSESSMENT — ENCOUNTER SYMPTOMS
ABDOMINAL PAIN: 0
DIFFICULTY URINATING: 0
HEMATURIA: 0
VOMITING: 1
FEVER: 0
HEADACHES: 1
DYSURIA: 0
NAUSEA: 1

## 2017-06-13 NOTE — ED AVS SNAPSHOT
[facility] Emergency Department    [address]    [facility] [address]    Phone:  [phone]    Fax:  [phone]                                       Luba Vargas   MRN: [id number]    Department:  [facility] Emergency Department   Date of Visit:  6/13/2017           After Visit Summary Signature Page     I have received my discharge instructions, and my questions have been answered. I have discussed any challenges I see with this plan with the nurse or doctor.    ..........................................................................................................................................  Patient/Patient Representative Signature      ..........................................................................................................................................  Patient Representative Print Name and Relationship to Patient    ..................................................               ................................................  Date                                            Time    ..........................................................................................................................................  Reviewed by Signature/Title    ...................................................              ..............................................  Date                                                            Time

## 2017-06-13 NOTE — ED AVS SNAPSHOT
[facility] Emergency Department    [address] [place] [address]    Phone:  [phone]    Fax:  [phone]                                       Luba Vargas   MRN: [id number]    Department:  [facility] Emergency Department   Date of Visit:  6/13/2017           Patient Information     Date Of Birth          1985        Your diagnoses for this visit were:     Other migraine without status migrainosus, not intractable     11 weeks gestation of pregnancy        You were seen by Aramis Cates MD.      Follow-up Information     Follow up with Heide Blackman MD In 1 day.    Specialty:  Family Practice    Why:  As needed    Contact information:    [facility]  [address]  [place] [address] [phone]          Discharge Instructions       Discharge Instructions  Headache    You were seen today for a headache. Headaches may be caused by many different things such as muscle tension, sinus inflammation, anxiety and stress, having too little sleep, too much alcohol, some medical conditions or injury. You may have a migraine, which is caused by changes in the blood vessels in your head.  At this time your doctor does not find that your headache is a sign of anything dangerous or life-threatening.  However, sometimes the signs of serious illness do not show up right away.  If you have new or worse symptoms, you may need to be seen again in the emergency department or by your primary doctor.      Return to the Emergency Department if:    You get a fever of 101 F or higher.    Your headache gets much worse.    You get a stiff neck with your headache.    You get a new headache that is different or worse than headaches you have had before.    You are vomiting and can t keep food or water down.    You have blurry or double vision or other problems with your eyes.    You have a new weakness on one side of your body.    You have difficulty with  balance which is new.    You or your family thinks you are confused.    You have a seizure or convulsion.    What can I do to help myself?    Pain medications - You may take a pain medication such as Tylenol  (acetaminophen), Advil , Nuprin  (ibuprofen) or Aleve  (naproxen).  If you have been given a narcotic such as Vicodin  (hydrocodone with acetaminophen), Percocet  (oxycodone with acetaminophen), codeine, or a muscle relaxant such as Flexeril  (cyclobenzaprine) or Soma  (carisoprodol), do not drive for four hours after you have taken it. If the narcotic contains Tylenol  (acetaminophen), do not take Tylenol  with it. All narcotics will cause constipation, so eat a high fiber diet.        Take a pain reliever as soon as you notice symptoms.  Starting medications as soon as you start to have symptoms may lessen the amount of pain you have.    Relaxing in a quiet, dark room may help.    Get enough sleep and eat meals regularly.    Schedule an appointment with your primary physician as instructed, or at least within 1 week.    You may need to watch for certain foods or other things which may trigger your headaches.  Keeping a journal of your headaches and possible triggers may help you and your primary doctor to identify things which you should avoid which may be causing your headaches.  If you were given a prescription for medicine here today, be sure to read all of the information (including the package insert) that comes with your prescription.  This will include important information about the medicine, its side effects, and any warnings that you need to know about.  The pharmacist who fills the prescription can provide more information and answer questions you may have about the medicine.  If you have questions or concerns that the pharmacist cannot address, please call or return to the Emergency Department.   Opioid Medication Information    Pain medications are among the most commonly prescribed medicines, so  we are including this information for all our patients. If you did not receive pain medication or get a prescription for pain medicine, you can ignore it.     You may have been given a prescription for an opioid (narcotic) pain medicine and/or have received a pain medicine while here in the Emergency Department. These medicines can make you drowsy or impaired. You must not drive, operate dangerous equipment, or engage in any other dangerous activities while taking these medications. If you drive while taking these medications, you could be arrested for DUI, or driving under the influence. Do not drink any alcohol while you are taking these medications.     Opioid pain medications can cause addiction. If you have a history of chemical dependency of any type, you are at a higher risk of becoming addicted to pain medications.  Only take these prescribed medications to treat your pain when all other options have been tried. Take it for as short a time and as few doses as possible. Store your pain pills in a secure place, as they are frequently stolen and provide a dangerous opportunity for children or visitors in your house to start abusing these powerful medications. We will not replace any lost or stolen medicine.  As soon as your pain is better, you should flush all your remaining medication.     Many prescription pain medications contain Tylenol  (acetaminophen), including Vicodin , Tylenol #3 , Norco , Lortab , and Percocet .  You should not take any extra pills of Tylenol  if you are using these prescription medications or you can get very sick.  Do not ever take more than 3000 mg of acetaminophen in any 24 hour period.    All opioids tend to cause constipation. Drink plenty of water and eat foods that have a lot of fiber, such as fruits, vegetables, prune juice, apple juice and high fiber cereal.  Take a laxative if you don t move your bowels at least every other day. Miralax , Milk of Magnesia, Colace , or  Senna  can be used to keep you regular.      Remember that you can always come back to the Emergency Department if you are not able to see your regular doctor in the amount of time listed above, if you get any new symptoms, or if there is anything that worries you.        24 Hour Appointment Hotline       To make an appointment at any [facility], call [phone] ([phone]). If you don't have a family doctor or clinic, we will help you find one. [facility] are conveniently located to serve the needs of you and your family.             Review of your medicines      Our records show that you are taking the medicines listed below. If these are incorrect, please call your family doctor or clinic.        Dose / Directions Last dose taken    albuterol 90 MCG/ACT inhaler   Dose:  2 puff        Inhale 2 puffs into the lungs every 4 hours as needed.   Refills:  0        cetirizine 10 MG tablet   Commonly known as:  zyrTEC   Dose:  10 mg   Quantity:  30 tablet        Take 1 tablet (10 mg) by mouth every evening   Refills:  1        fluticasone 50 MCG/ACT spray   Commonly known as:  FLONASE   Dose:  1-2 spray   Quantity:  3 Bottle        Spray 1-2 sprays into both nostrils daily   Refills:  11        prenatal multivitamin  plus iron 27-0.8 MG Tabs per tablet   Dose:  1 tablet   Quantity:  100 tablet        Take 1 tablet by mouth daily   Refills:  3        sertraline 100 MG tablet   Commonly known as:  ZOLOFT   Dose:  100 mg   Quantity:  90 tablet        Take 1 tablet (100 mg) by mouth daily   Refills:  1                Procedures and tests performed during your visit     Basic metabolic panel      Orders Needing Specimen Collection     None      Pending Results     No orders found from 6/11/2017 to 6/14/2017.            Pending Culture Results     No orders found from 6/11/2017 to 6/14/2017.            Pending Results Instructions     If you had any lab results that were not finalized at the time of your  Discharge, you can call the ED Lab Result RN at [phone]. You will be contacted by this team for any positive Lab results or changes in treatment. The nurses are available 7 days a week from 10A to 6:30P.  You can leave a message 24 hours per day and they will return your call.        Test Results From Your Hospital Stay        6/13/2017  9:41 PM      Component Results     Component Value Ref Range & Units Status    Sodium 136 133 - 144 mmol/L Final    Potassium 3.6 3.4 - 5.3 mmol/L Final    Chloride 103 94 - 109 mmol/L Final    Carbon Dioxide 24 20 - 32 mmol/L Final    Anion Gap 9 3 - 14 mmol/L Final    Glucose 87 70 - 99 mg/dL Final    Urea Nitrogen 8 7 - 30 mg/dL Final    Creatinine 0.51 (L) 0.52 - 1.04 mg/dL Final    GFR Estimate >90  Non  GFR Calc   >60 mL/min/1.7m2 Final    GFR Estimate If Black >90   GFR Calc   >60 mL/min/1.7m2 Final    Calcium 8.8 8.5 - 10.1 mg/dL Final                Clinical Quality Measure: Blood Pressure Screening     Your blood pressure was checked while you were in the emergency department today. The last reading we obtained was  BP: 122/58 . Please read the guidelines below about what these numbers mean and what you should do about them.  If your systolic blood pressure (the top number) is less than 120 and your diastolic blood pressure (the bottom number) is less than 80, then your blood pressure is normal. There is nothing more that you need to do about it.  If your systolic blood pressure (the top number) is 120-139 or your diastolic blood pressure (the bottom number) is 80-89, your blood pressure may be higher than it should be. You should have your blood pressure rechecked within a year by a primary care provider.  If your systolic blood pressure (the top number) is 140 or greater or your diastolic blood pressure (the bottom number) is 90 or greater, you may have high blood pressure. High blood pressure is treatable, but if left untreated over  "time it can put you at risk for heart attack, stroke, or kidney failure. You should have your blood pressure rechecked by a primary care provider within the next 4 weeks.  If your provider in the emergency department today gave you specific instructions to follow-up with your doctor or provider even sooner than that, you should follow that instruction and not wait for up to 4 weeks for your follow-up visit.        Thank you for choosing [facility]       Thank you for choosing [facility] for your care. Our goal is always to provide you with excellent care. Hearing back from our patients is one way we can continue to improve our services. Please take a few minutes to complete the written survey that you may receive in the mail after you visit with us. Thank you!        [employer] Information     [employer] lets you send messages to your doctor, view your test results, renew your prescriptions, schedule appointments and more. To sign up, go to [email or web address] . Click on \"Log in\" on the left side of the screen, which will take you to the Welcome page. Then click on \"Sign up Now\" on the right side of the page.     You will be asked to enter the access code listed below, as well as some personal information. Please follow the directions to create your username and password.     Your access code is: [email or web address]  Expires: 2017  3:26 PM     Your access code will  in 90 days. If you need help or a new code, please call your [facility] clinic or [phone].        Care EveryWhere ID     This is your Care EveryWhere ID. This could be used by other organizations to access your [facility] medical records  [id number]        After Visit Summary       This is your record. Keep this with you and show to your community pharmacist(s) and doctor(s) at your next visit.                  "

## 2017-06-14 NOTE — ED PROVIDER NOTES
History     Chief Complaint:  Headache      HPI   Luba Vargas is a 31 year old female, , 11 weeks pregnant, with a history of migraine, who presents for evaluation and treatment of a 10/10 headache. The patient states that she has been getting right sided migraines every week since becoming pregnant with light sensitivity, but says that this one today is more intense and associated with nausea and vomiting, which is atypical for her. However, otherwise this seems like a typical migraine for her. This headache began today around noon, while at work. She thinks that her migraine may be induced by the lighting at work, as the lights in high school used to induce her headaches when she was younger. The patient presents for pain management given that none of her normal medications for migraines are safe for her to take during pregnancy. Patient denies any recent fevers, cramping, contractions, vaginal bleeding, vaginal discharge, dysuria, or other abnormal urinary symptoms.     Allergies:  Bee Venom  Cephalosporins      Medications:    Cetirizine (zyrtec) 10 mg tablet  Prenatal vit-fe fumarate-fa (prenatal multivitamin  plus iron) 27-0.8 mg tabs per tablet  Sertraline (zoloft) 100 mg tablet  Fluticasone (flonase) 50 mcg/act spray  Albuterol 90 mcg/act inhaler     Past Medical History:    Abnormal Pap smear of cervix   Asthma   Blood type O+   Cervicalgia  Genital herpes simplex, unspecified site  Kidney stones   Major depressive disorder   Migraine   Varicella without mention of complication     Past Surgical History:    Conization leep   Laser co2 vagina   Myringotomy, insert tube, combined, 2 sets  Tonsillectomy     Family History:    Diabetes  Thyroid disease  Diabetes  Lung cancer    Social History:  Relationship status:   Tobacco use: Neg  Alcohol use: Neg  The patient presents alone.       Review of Systems   Constitutional: Negative for fever.   Gastrointestinal: Positive for nausea and  vomiting. Negative for abdominal pain.   Genitourinary: Negative for difficulty urinating, dysuria, hematuria, pelvic pain, urgency, vaginal bleeding and vaginal discharge.   Neurological: Positive for headaches.        Positive for light sensitivity.   All other systems reviewed and are negative.    Physical Exam   First Vitals:  BP: 122/58  Pulse: 91  Heart Rate: 91  Temp: 98  F (36.7  C)  Resp: 18  SpO2: 99 %    Physical Exam  General: Adult female, lying on the gurney. Appears uncomfortable.   HEENT:   No meningismus.     The scalp and head appear normal    The pupils are equal, round, and reactive to light    Extraocular muscles are intact.    The nose is normal.    The oropharynx is normal.      Uvula is in the midline.  There is no peritonsillar abscess.  Neck:  Normal range of motion.    Lungs:  Clear.      No rales, no wheezing.      There is no tachypnea.  Non-labored.  Cardiac: Regular rate.      Normal S1 and S2.      No S3 or S4.      No pathological murmur.      No pericardial rub.  Abdomen: Soft. No distension. No tympani. No rebound. Non-tender.  Lymph: No anterior or posterior cervical lymphadenopathy noted.  MS:  Normal tone.      Normal movement of all extremities.    Neuro:  Normal mentation.  No focal motor or sensory changes.      Speech normal.  Psych:  Awake.     Alert.      Normal affect.      Appropriate interactions.  Skin:  No rash.      No lesions.       Emergency Department Course   Laboratory:  BMP: Creatinine 0.51 (L), o/w WNL     Interventions:   2120: Normal Saline, 1000 mL, IV  2120: Benadryl, 25 mg, IV  2120: Reglan, 10 mg, IV    Emergency Department Course:  Nursing notes and vitals reviewed.  I performed an exam of the patient as documented above.  The above workup was undertaken.  2200: I rechecked the patient and discussed results.    Findings and plan explained to the Patient. Patient discharged home, status improved, with instructions regarding supportive care, medications,  and reasons to return as well as the importance of close follow-up was reviewed.     Impression & Plan    Medical Decision Making:  The patient presented with a headache consistent with her normal migraine in exasperation.  Evaluation in the emergency department has been negative. The patient has not had any fever, weakness, numbness, paresthesia, neck stiffness or confusion. Meningitis, subarachnoid hemorrhage, CNS tumor, and stroke are considered as part of the differential, and considered unlikely. No indication for imagery. The pain has improved with medication interventions.  The patient should follow-up with her primary physician within 3 days. If the headache continues or the frequency increases, consultation with neurology will be indicated.  Return if increasing pain, fever, vomiting or weakness.  Take prescribed medications as directed.    Diagnosis:    ICD-10-CM   1. Other migraine without status migrainosus, not intractable G43.809   2. 11 weeks gestation of pregnancy Z3A.11       Disposition:  discharged to home    Diamante RICHARDS, am serving as a scribe on 6/13/2017 at 8:52 PM to personally document services performed by Aramis Cates MD, based on my observations and the provider's statements to me.     [facility] EMERGENCY DEPARTMENT       Aramis Cates MD  06/16/17 4837

## 2017-09-13 ENCOUNTER — PRE VISIT (OUTPATIENT)
Dept: MATERNAL FETAL MEDICINE | Facility: CLINIC | Age: 32
End: 2017-09-13

## 2017-09-19 ENCOUNTER — HOSPITAL ENCOUNTER (OUTPATIENT)
Dept: ULTRASOUND IMAGING | Facility: CLINIC | Age: 32
Discharge: HOME | End: 2017-09-19
Attending: OBSTETRICS & GYNECOLOGY | Admitting: OBSTETRICS & GYNECOLOGY
Payer: COMMERCIAL

## 2017-09-19 ENCOUNTER — OFFICE VISIT (OUTPATIENT)
Dept: MATERNAL FETAL MEDICINE | Facility: CLINIC | Age: 32
End: 2017-09-19
Attending: OBSTETRICS & GYNECOLOGY
Payer: COMMERCIAL

## 2017-09-19 DIAGNOSIS — Z03.73 SUSPECTED FETAL ANOMALY NOT FOUND: Primary | ICD-10-CM

## 2017-09-19 DIAGNOSIS — O26.90 PREGNANCY RELATED CONDITION, UNSPECIFIED TRIMESTER: ICD-10-CM

## 2017-09-19 PROCEDURE — 76811 OB US DETAILED SNGL FETUS: CPT

## 2017-09-19 NOTE — PROGRESS NOTES
"Please see \"Imaging\" tab under \"Chart Review\" for details of today's visit.    Ankita Garcia    "

## 2017-09-19 NOTE — MR AVS SNAPSHOT
"              After Visit Summary   2017    Luba Vargas    MRN: [id number]           Patient Information     Date Of Birth          1985        Visit Information        Provider Department      2017 9:15 AM Ankita Garcia MD [facility] Maternal Fetal Medicine [facility]        Today's Diagnoses     Suspected fetal anomaly not found    -  1       Follow-ups after your visit        Who to contact     If you have questions or need follow up information about today's clinic visit or your schedule please contact [facility] FETAL [facility] directly at [phone].  Normal or non-critical lab and imaging results will be communicated to you by [employer], letter or phone within 4 business days after the clinic has received the results. If you do not hear from us within 7 days, please contact the clinic through Tap[employer] or phone. If you have a critical or abnormal lab result, we will notify you by phone as soon as possible.  Submit refill requests through [employer] or call your pharmacy and they will forward the refill request to us. Please allow 3 business days for your refill to be completed.          Additional Information About Your Visit        [employer] Information     [employer] lets you send messages to your doctor, view your test results, renew your prescriptions, schedule appointments and more. To sign up, go to [email or web address] . Click on \"Log in\" on the left side of the screen, which will take you to the Welcome page. Then click on \"Sign up Now\" on the right side of the page.     You will be asked to enter the access code listed below, as well as some personal information. Please follow the directions to create your username and password.     Your access code is: [email or web address]  Expires: 2017 11:41 AM     Your access code will  in 90 days. If you need help or a new code, please call your [facility] clinic or [phone].        Care EveryWhere ID     This is your Care " EveryWhere ID. This could be used by other organizations to access your [place] medical records  [id number]        Your Vitals Were     Last Period                   03/16/2017 (Exact Date)            Blood Pressure from Last 3 Encounters:   06/13/17 122/58   05/24/17 104/73   05/24/17 104/73    Weight from Last 3 Encounters:   05/24/17 82.1 kg (180 lb 14.4 oz)   05/24/17 82.1 kg (180 lb 14.4 oz)   10/25/11 75.1 kg (165 lb 9.1 oz)              Today, you had the following     No orders found for display       Primary Care Provider Office Phone # Fax #    Heide Blackman [phone]       [facility] [address]  [place] [address]        Equal Access to Services     [facility]: Hadii aad ku hadasho Soomaali, waaxda luqadaha, qaybta kaalmada adeegyada, waxay amira haypamela shankar . So [facility] [phone].    ATENCIÓN: Si habla español, tiene a santana disposición servicios gratuitos de asistencia lingüística. Anthony al [phone].    We comply with applicable federal civil rights laws and [place] laws. We do not discriminate on the basis of race, color, national origin, age, disability sex, sexual orientation or gender identity.            Thank you!     Thank you for choosing [facility]  for your care. Our goal is always to provide you with excellent care. Hearing back from our patients is one way we can continue to improve our services. Please take a few minutes to complete the written survey that you may receive in the mail after your visit with us. Thank you!             Your Updated Medication List - Protect others around you: Learn how to safely use, store and throw away your medicines at www.disposemymeds.org.          This list is accurate as of: 9/19/17 11:41 AM.  Always use your most recent med list.                   Brand Name Dispense Instructions for use Diagnosis    albuterol 90 MCG/ACT inhaler      Inhale 2 puffs into  the lungs every 4 hours as needed.        cetirizine 10 MG tablet    zyrTEC    30 tablet    Take 1 tablet (10 mg) by mouth every evening    Amenorrhea       fluticasone 50 MCG/ACT spray    FLONASE    3 Bottle    Spray 1-2 sprays into both nostrils daily    Amenorrhea       prenatal multivitamin plus iron 27-0.8 MG Tabs per tablet     100 tablet    Take 1 tablet by mouth daily    Amenorrhea       sertraline 100 MG tablet    ZOLOFT    90 tablet    Take 1 tablet (100 mg) by mouth daily    Amenorrhea

## 2017-11-20 ENCOUNTER — HOSPITAL ENCOUNTER (OUTPATIENT)
Facility: CLINIC | Age: 32
Discharge: HOME | End: 2017-11-20
Attending: OBSTETRICS & GYNECOLOGY | Admitting: OBSTETRICS & GYNECOLOGY
Payer: COMMERCIAL

## 2017-11-20 VITALS
TEMPERATURE: 98.5 F | SYSTOLIC BLOOD PRESSURE: 125 MMHG | BODY MASS INDEX: 33.8 KG/M2 | WEIGHT: 198 LBS | RESPIRATION RATE: 17 BRPM | DIASTOLIC BLOOD PRESSURE: 77 MMHG | HEIGHT: 64 IN

## 2017-11-20 LAB
ALT SERPL W P-5'-P-CCNC: 13 U/L (ref 0–50)
AST SERPL W P-5'-P-CCNC: 13 U/L (ref 0–45)
CREAT SERPL-MCNC: 0.59 MG/DL (ref 0.52–1.04)
CREAT UR-MCNC: 212 MG/DL
ERYTHROCYTE [DISTWIDTH] IN BLOOD BY AUTOMATED COUNT: 13.2 % (ref 10–15)
GFR SERPL CREATININE-BSD FRML MDRD: >90 ML/MIN/1.7M2
HCT VFR BLD AUTO: 35.6 % (ref 35–47)
HGB BLD-MCNC: 11.9 G/DL (ref 11.7–15.7)
MCH RBC QN AUTO: 29.2 PG (ref 26.5–33)
MCHC RBC AUTO-ENTMCNC: 33.4 G/DL (ref 31.5–36.5)
MCV RBC AUTO: 88 FL (ref 78–100)
PLATELET # BLD AUTO: 235 10E9/L (ref 150–450)
PROT UR-MCNC: 0.37 G/L
PROT/CREAT 24H UR: 0.17 G/G CR (ref 0–0.2)
RBC # BLD AUTO: 4.07 10E12/L (ref 3.8–5.2)
URATE SERPL-MCNC: 3.8 MG/DL (ref 2.6–6)
WBC # BLD AUTO: 9.2 10E9/L (ref 4–11)

## 2017-11-20 PROCEDURE — 59025 FETAL NON-STRESS TEST: CPT

## 2017-11-20 PROCEDURE — 82565 ASSAY OF CREATININE: CPT | Performed by: OBSTETRICS & GYNECOLOGY

## 2017-11-20 PROCEDURE — 99214 OFFICE O/P EST MOD 30 MIN: CPT | Mod: 25

## 2017-11-20 PROCEDURE — 85027 COMPLETE CBC AUTOMATED: CPT | Performed by: OBSTETRICS & GYNECOLOGY

## 2017-11-20 PROCEDURE — 84450 TRANSFERASE (AST) (SGOT): CPT | Performed by: OBSTETRICS & GYNECOLOGY

## 2017-11-20 PROCEDURE — 84550 ASSAY OF BLOOD/URIC ACID: CPT | Performed by: OBSTETRICS & GYNECOLOGY

## 2017-11-20 PROCEDURE — 36415 COLL VENOUS BLD VENIPUNCTURE: CPT | Performed by: OBSTETRICS & GYNECOLOGY

## 2017-11-20 PROCEDURE — 84460 ALANINE AMINO (ALT) (SGPT): CPT | Performed by: OBSTETRICS & GYNECOLOGY

## 2017-11-20 PROCEDURE — 84156 ASSAY OF PROTEIN URINE: CPT | Performed by: OBSTETRICS & GYNECOLOGY

## 2017-11-20 RX ORDER — ONDANSETRON 2 MG/ML
4 INJECTION INTRAMUSCULAR; INTRAVENOUS EVERY 6 HOURS PRN
Status: DISCONTINUED | OUTPATIENT
Start: 2017-11-20 | End: 2017-11-20 | Stop reason: HOSPADM

## 2017-11-20 NOTE — IP AVS SNAPSHOT
MRN:[id number]                      After Visit Summary   11/20/2017    Luba Vargas    MRN: [id number]           Thank you!     Thank you for choosing [facility] for your care. Our goal is always to provide you with excellent care. Hearing back from our patients is one way we can continue to improve our services. Please take a few minutes to complete the written survey that you may receive in the mail after you visit. If you would like to speak to someone directly about your visit please contact Patient Relations at [phone]. Thank you!          Patient Information     Date Of Birth          1985        About your hospital stay     You were admitted on:  November 20, 2017 You last received care in the:  [facility] Labor [facility]    You were discharged on:  November 20, 2017       Who to Call     For medical emergencies, please call 911.  For non-urgent questions about your medical care, please call your primary care provider or clinic, [phone]          Attending Provider     Provider Pat Ferrer MD OB/Gyn       Primary Care Provider Office Phone # Fax #    Heide Blackman [phone]      Further instructions from your care team       Discharge Instruction for Undelivered Patients      You were seen for: R/O Pre-eclampsia  We Consulted: Dr Ramirez  You had (Test or Medicine):blood and urine testing, NST     Diet:   Drink 8 to 12 glasses of liquids (milk, juice, water) every day.  You may eat meals and snacks.     Activity:  Call your doctor or nurse midwife if your baby is moving less than usual.     Call your provider if you notice:  Swelling in your face or increased swelling in your hands or legs.  Headaches that are not relieved by Tylenol (acetaminophen).  Changes in your vision (blurring: seeing spots or stars.)  Nausea (sick to your stomach) and vomiting (throwing up).   Weight gain of 5 pounds or more  "per week.  Heartburn that doesn't go away.  Signs of bladder infection: pain when you urinate (use the toilet), need to go more often and more urgently.  The bag of agustin (rupture of membranes) breaks, or you notice leaking in your underwear.  Bright red blood in your underwear.  Abdominal (lower belly) or stomach pain.  For first baby: Contractions (tightening) less than 5 minutes apart for one hour or more.  Increase or change in vaginal discharge (note the color and amount)  Other: increase water intake while decreasing salt ingestion. Rest as much as able with feet up. OK to work at [employer] job, but don't push yourself    Follow-up:  Make an appointment to be seen on  or           Pending Results     No orders found from 2017 to 2017.            Admission Information     Date & Time Provider Department Dept. Phone    2017 Pat Ramirez MD [facility] Labor and Delivery [phone]      Your Vitals Were     Blood Pressure Temperature Respirations Height Weight Last Period    125 98.5  F (36.9  C) (Oral) 18 1.626 m (5' 4\") 89.8 kg (198 lb) 2017 (Exact Date)    BMI (Body Mass Index)                   33.99 kg/m2           MyChart Information     [employer] lets you send messages to your doctor, view your test results, renew your prescriptions, schedule appointments and more. To sign up, go to [email or web address] . Click on \"Log in\" on the left side of the screen, which will take you to the Welcome page. Then click on \"Sign up Now\" on the right side of the page.     You will be asked to enter the access code listed below, as well as some personal information. Please follow the directions to create your username and password.     Your access code is: [email or web address]  Expires: 2017 10:41 AM     Your access code will  in 90 days. If you need help or a new code, please call your [facility] or [phone].        Care EveryWhere ID     This is your Care " EveryWhere ID. This could be used by other organizations to access your [place] medical records  [id number]        Equal Access to Services     [facility]TO[facility] : Hadii aad ku hadyoelmary Soduncanali, wapaolada luqadaha, qashannanta kaalmada sunil, mer meein hayaajasen fongdusty aggarwal raad engel. So [facility] [phone].    ATENCIÓN: Si habla español, tiene a santana disposición servicios gratuitos de asistencia lingüística. Anthony al [phone].    We comply with applicable federal civil rights laws and [place] laws. We do not discriminate on the basis of race, color, national origin, age, disability, sex, sexual orientation, or gender identity.               Review of your medicines      UNREVIEWED medicines. Ask your doctor about these medicines        Dose / Directions    albuterol 90 MCG/ACT inhaler        Dose:  2 puff   Inhale 2 puffs into the lungs every 4 hours as needed.   Refills:  0       cetirizine 10 MG tablet   Commonly known as:  zyrTEC   Used for:  Amenorrhea        Dose:  10 mg   Take 1 tablet (10 mg) by mouth every evening   Quantity:  30 tablet   Refills:  1       fluticasone 50 MCG/ACT spray   Commonly known as:  FLONASE   Used for:  Amenorrhea        Dose:  1-2 spray   Spray 1-2 sprays into both nostrils daily   Quantity:  3 Bottle   Refills:  11       prenatal multivitamin plus iron 27-0.8 MG Tabs per tablet   Used for:  Amenorrhea        Dose:  1 tablet   Take 1 tablet by mouth daily   Quantity:  100 tablet   Refills:  3       sertraline 100 MG tablet   Commonly known as:  ZOLOFT   Used for:  Amenorrhea        Dose:  100 mg   Take 1 tablet (100 mg) by mouth daily   Quantity:  90 tablet   Refills:  1                Protect others around you: Learn how to safely use, store and throw away your medicines at www.disposemymeds.org.             Medication List: This is a list of all your medications and when to take them. Check marks below indicate your daily home schedule. Keep this list as a reference.      Medications            Morning Afternoon Evening Bedtime As Needed    albuterol 90 MCG/ACT inhaler   Inhale 2 puffs into the lungs every 4 hours as needed.                                cetirizine 10 MG tablet   Commonly known as:  zyrTEC   Take 1 tablet (10 mg) by mouth every evening                                fluticasone 50 MCG/ACT spray   Commonly known as:  FLONASE   Spray 1-2 sprays into both nostrils daily                                prenatal multivitamin plus iron 27-0.8 MG Tabs per tablet   Take 1 tablet by mouth daily                                sertraline 100 MG tablet   Commonly known as:  ZOLOFT   Take 1 tablet (100 mg) by mouth daily

## 2017-11-20 NOTE — PROVIDER NOTIFICATION
11/20/17 1444   Provider Notification   Provider Name/Title Ashley   Method of Notification Phone   Request Evaluate - Remote   Notification Reason Patient Arrived;Lab/Diagnostic Study   MD updated of lab results and BPs WNL--see flowsheets. Denies headache, nausea, epigastric pain at this time. Significant swelling, clonus, and hyperreflexia noted. MD ordered d/c to home, but needs to reschedule an appointment to be seen in the office Tuesday 11/21 or Wednesday 11/22. Will encourage pt to limit salt intake while increasing water intake. Pt to call if symptoms worsen or return and should not work if not feeling well.

## 2017-11-20 NOTE — IP AVS SNAPSHOT
[facility] Labor and Delivery    [address]    [facility] [address]    Phone:  [phone]    Fax:  [phone]                                       After Visit Summary   11/20/2017    Luba Vargas    MRN: [id number]           After Visit Summary Signature Page     I have received my discharge instructions, and my questions have been answered. I have discussed any challenges I see with this plan with the nurse or doctor.    ..........................................................................................................................................  Patient/Patient Representative Signature      ..........................................................................................................................................  Patient Representative Print Name and Relationship to Patient    ..................................................               ................................................  Date                                            Time    ..........................................................................................................................................  Reviewed by Signature/Title    ...................................................              ..............................................  Date                                                            Time

## 2017-11-20 NOTE — PLAN OF CARE
here for r/o PIH. Monitors placed and explained, history obtained. Denies any leaking of fluid or vaginal bleeding. Normal fetal movement. Pt c/o HA, visual disturbances and swelling over the last week.  Denies feeling any contractions.  Reflexes very brisk and clonus noted on both feet. Ordres placed for labs.

## 2017-11-20 NOTE — DISCHARGE INSTRUCTIONS
Discharge Instruction for Undelivered Patients      You were seen for: R/O Pre-eclampsia  We Consulted: Dr Ramirez  You had (Test or Medicine):blood and urine testing, NST     Diet:   Drink 8 to 12 glasses of liquids (milk, juice, water) every day.  You may eat meals and snacks.     Activity:  Call your doctor or nurse midwife if your baby is moving less than usual.     Call your provider if you notice:  Swelling in your face or increased swelling in your hands or legs.  Headaches that are not relieved by Tylenol (acetaminophen).  Changes in your vision (blurring: seeing spots or stars.)  Nausea (sick to your stomach) and vomiting (throwing up).   Weight gain of 5 pounds or more per week.  Heartburn that doesn't go away.  Signs of bladder infection: pain when you urinate (use the toilet), need to go more often and more urgently.  The bag of agustin (rupture of membranes) breaks, or you notice leaking in your underwear.  Bright red blood in your underwear.  Abdominal (lower belly) or stomach pain.  For first baby: Contractions (tightening) less than 5 minutes apart for one hour or more.  Increase or change in vaginal discharge (note the color and amount)  Other: increase water intake while decreasing salt ingestion. Rest as much as able with feet up. OK to work at desk job, but don't push yourself    Follow-up:  Make an appointment to be seen on 11/21 or 11/22

## 2017-12-04 LAB — GROUP B STREP PCR: NEGATIVE

## 2017-12-07 ENCOUNTER — HOSPITAL ENCOUNTER (OUTPATIENT)
Facility: CLINIC | Age: 32
Discharge: HOME | End: 2017-12-07
Attending: OBSTETRICS & GYNECOLOGY | Admitting: OBSTETRICS & GYNECOLOGY
Payer: COMMERCIAL

## 2017-12-07 VITALS — RESPIRATION RATE: 18 BRPM | SYSTOLIC BLOOD PRESSURE: 126 MMHG | DIASTOLIC BLOOD PRESSURE: 80 MMHG | TEMPERATURE: 98.1 F

## 2017-12-07 LAB
ALT SERPL W P-5'-P-CCNC: 14 U/L (ref 0–50)
ANION GAP SERPL CALCULATED.3IONS-SCNC: 6 MMOL/L (ref 3–14)
AST SERPL W P-5'-P-CCNC: 13 U/L (ref 0–45)
BUN SERPL-MCNC: 10 MG/DL (ref 7–30)
CALCIUM SERPL-MCNC: 8.3 MG/DL (ref 8.5–10.1)
CHLORIDE SERPL-SCNC: 106 MMOL/L (ref 94–109)
CO2 SERPL-SCNC: 25 MMOL/L (ref 20–32)
CREAT SERPL-MCNC: 0.71 MG/DL (ref 0.52–1.04)
CREAT UR-MCNC: 376 MG/DL
ERYTHROCYTE [DISTWIDTH] IN BLOOD BY AUTOMATED COUNT: 13.8 % (ref 10–15)
GFR SERPL CREATININE-BSD FRML MDRD: >90 ML/MIN/1.7M2
GLUCOSE SERPL-MCNC: 123 MG/DL (ref 70–99)
HCT VFR BLD AUTO: 37.7 % (ref 35–47)
HGB BLD-MCNC: 12.4 G/DL (ref 11.7–15.7)
MCH RBC QN AUTO: 29.2 PG (ref 26.5–33)
MCHC RBC AUTO-ENTMCNC: 32.9 G/DL (ref 31.5–36.5)
MCV RBC AUTO: 89 FL (ref 78–100)
PLATELET # BLD AUTO: 248 10E9/L (ref 150–450)
POTASSIUM SERPL-SCNC: 4 MMOL/L (ref 3.4–5.3)
PROT UR-MCNC: 0.75 G/L
PROT/CREAT 24H UR: 0.2 G/G CR (ref 0–0.2)
RBC # BLD AUTO: 4.24 10E12/L (ref 3.8–5.2)
SODIUM SERPL-SCNC: 137 MMOL/L (ref 133–144)
URATE SERPL-MCNC: 4.8 MG/DL (ref 2.6–6)
WBC # BLD AUTO: 10.3 10E9/L (ref 4–11)

## 2017-12-07 PROCEDURE — 99214 OFFICE O/P EST MOD 30 MIN: CPT | Mod: 25

## 2017-12-07 PROCEDURE — 84460 ALANINE AMINO (ALT) (SGPT): CPT | Performed by: OBSTETRICS & GYNECOLOGY

## 2017-12-07 PROCEDURE — 80048 BASIC METABOLIC PNL TOTAL CA: CPT | Performed by: OBSTETRICS & GYNECOLOGY

## 2017-12-07 PROCEDURE — 85027 COMPLETE CBC AUTOMATED: CPT | Performed by: OBSTETRICS & GYNECOLOGY

## 2017-12-07 PROCEDURE — 84156 ASSAY OF PROTEIN URINE: CPT | Performed by: OBSTETRICS & GYNECOLOGY

## 2017-12-07 PROCEDURE — 36415 COLL VENOUS BLD VENIPUNCTURE: CPT | Performed by: OBSTETRICS & GYNECOLOGY

## 2017-12-07 PROCEDURE — 84550 ASSAY OF BLOOD/URIC ACID: CPT | Performed by: OBSTETRICS & GYNECOLOGY

## 2017-12-07 PROCEDURE — 84450 TRANSFERASE (AST) (SGOT): CPT | Performed by: OBSTETRICS & GYNECOLOGY

## 2017-12-07 PROCEDURE — 59025 FETAL NON-STRESS TEST: CPT

## 2017-12-07 RX ORDER — ACETAMINOPHEN 325 MG/1
975 TABLET ORAL EVERY 4 HOURS PRN
Status: DISCONTINUED | OUTPATIENT
Start: 2017-12-07 | End: 2017-12-07 | Stop reason: HOSPADM

## 2017-12-07 NOTE — PLAN OF CARE
Luba here from office for further eval of [facility].   States fetus active.  FHR reassuring.  Denies and ctx's/LOF/bldg.  Has had a HA for the last couple of days- does have a hx of migraines.  No other s/s of Preeclampsia.  Awaiting lab results

## 2017-12-07 NOTE — IP AVS SNAPSHOT
MRN:[id number]                      After Visit Summary   12/7/2017    Luba Vargas    MRN: [id number]           Thank you!     Thank you for choosing [facility] for your care. Our goal is always to provide you with excellent care. Hearing back from our patients is one way we can continue to improve our services. Please take a few minutes to complete the written survey that you may receive in the mail after you visit. If you would like to speak to someone directly about your visit please contact Patient Relations at [phone]. Thank you!          Patient Information     Date Of Birth          1985        About your hospital stay     You were admitted on:  December 7, 2017 You last received care in the:  [facility] Labor and Delivery    You were discharged on:  December 7, 2017       Who to Call     For medical emergencies, please call 911.  For non-urgent questions about your medical care, please call your primary care provider or clinic, [phone]          Attending Provider     Provider Specialty    Pat Ramirez MD OB/Gyn    Tima Torres MD OB/Gyn       Primary Care Provider Office Phone # Fax #    Heide ELLA Blackman [phone]      Further instructions from your care team       Discharge Instruction for Undelivered Patients      You were seen for: Fetal Assessment  We Consulted: Dr. Torres  You had (Test or Medicine):fetal monitoring, labs (normal)     Diet:   Drink 8 to 12 glasses of liquids (milk, juice, water) every day.  You may eat meals and snacks.     Activity:  Call your doctor or nurse midwife if your baby is moving less than usual.     Call your provider if you notice:  Swelling in your face or increased swelling in your hands or legs.  Headaches that are not relieved by Tylenol (acetaminophen).  Changes in your vision (blurring: seeing spots or stars.)  Nausea (sick to your stomach) and vomiting (throwing up).   Weight  "gain of 5 pounds or more per week.  Heartburn that doesn't go away.  Signs of bladder infection: pain when you urinate (use the toilet), need to go more often and more urgently.  The bag of agustin (rupture of membranes) breaks, or you notice leaking in your underwear.  Bright red blood in your underwear.  Abdominal (lower belly) or stomach pain.  For first baby: Contractions (tightening) less than 5 minutes apart for one hour or more.  Increase or change in vaginal discharge (note the color and amount)      Follow-up:  As scheduled in the clinic          Pending Results     No orders found from 2017 to 2017.            Admission Information     Date & Time Provider Department Dept. Phone    2017 Tima Torres MD [facility] Labor and Delivery [phone]      Your Vitals Were     Blood Pressure Temperature Respirations Last Period          127/75 98.1  F (36.7  C) (Oral) 18 2017 (Exact Date)        MyChart Information     [employer] lets you send messages to your doctor, view your test results, renew your prescriptions, schedule appointments and more. To sign up, go to [email or web address] . Click on \"Log in\" on the left side of the screen, which will take you to the Welcome page. Then click on \"Sign up Now\" on the right side of the page.     You will be asked to enter the access code listed below, as well as some personal information. Please follow the directions to create your username and password.     Your access code is: [email or web address]  Expires: 2017 10:41 AM     Your access code will  in 90 days. If you need help or a new code, please call your [place] clinic or [phone].        Care EveryWhere ID     This is your Care EveryWhere ID. This could be used by other organizations to access your [place] medical records  [id number]        Equal Access to Services     ROC MONTOYA AH: regla Up, mer odell " no shankar ah. So [facility] [phone].    ATENCIÓN: Si habla garland, tiene a santana disposición servicios gratuitos de asistencia lingüística. Anthony al [phone].    We comply with applicable federal civil rights laws and [place] laws. We do not discriminate on the basis of race, color, national origin, age, disability, sex, sexual orientation, or gender identity.               Review of your medicines      UNREVIEWED medicines. Ask your doctor about these medicines        Dose / Directions    albuterol 90 MCG/ACT inhaler        Dose:  2 puff   Inhale 2 puffs into the lungs every 4 hours as needed.   Refills:  0       cetirizine 10 MG tablet   Commonly known as:  zyrTEC   Used for:  Amenorrhea        Dose:  10 mg   Take 1 tablet (10 mg) by mouth every evening   Quantity:  30 tablet   Refills:  1       fluticasone 50 MCG/ACT spray   Commonly known as:  FLONASE   Used for:  Amenorrhea        Dose:  1-2 spray   Spray 1-2 sprays into both nostrils daily   Quantity:  3 Bottle   Refills:  11       prenatal multivitamin plus iron 27-0.8 MG Tabs per tablet   Used for:  Amenorrhea        Dose:  1 tablet   Take 1 tablet by mouth daily   Quantity:  100 tablet   Refills:  3       sertraline 100 MG tablet   Commonly known as:  ZOLOFT   Used for:  Amenorrhea        Dose:  100 mg   Take 1 tablet (100 mg) by mouth daily   Quantity:  90 tablet   Refills:  1       VALTREX PO        Refills:  0               ANTIBIOTIC INSTRUCTION     You've Been Prescribed an Antibiotic - Now What?  Your healthcare team thinks that you or your loved one might have an infection. Some infections can be treated with antibiotics, which are powerful, life-saving drugs. Like all medications, antibiotics have side effects and should only be used when necessary. There are some important things you should know about your antibiotic treatment.      Your healthcare team may run tests before you start taking an antibiotic.    Your team may take  samples (e.g., from your blood, urine or other areas) to run tests to look for bacteria. These test can be important to determine if you need an antibiotic at all and, if you do, which antibiotic will work best.      Within a few days, your healthcare team might change or even stop your antibiotic.    Your team may start you on an antibiotic while they are working to find out what is making you sick.    Your team might change your antibiotic because test results show that a different antibiotic would be better to treat your infection.    In some cases, once your team has more information, they learn that you do not need an antibiotic at all. They may find out that you don't have an infection, or that the antibiotic you're taking won't work against your infection. For example, an infection caused by a virus can't be treated with antibiotics. Staying on an antibiotic when you don't need it is more likely to be harmful than helpful.      You may experience side effects from your antibiotic.    Like all medications, antibiotics have side effects. Some of these can be serious.    Let you healthcare team know if you have any known allergies when you are admitted to the hospital.    One significant side effect of nearly all antibiotics is the risk of severe and sometimes deadly diarrhea caused by Clostridium difficile (C. Difficile). This occurs when a person takes antibiotics because some good germs are destroyed. Antibiotic use allows C. diificile to take over, putting patients at high risk for this serious infection.    As a patient or caregiver, it is important to understand your or your loved one's antibiotic treatment. It is especially important for caregivers to speak up when patients can't speak for themselves. Here are some important questions to ask your healthcare team.    What infection is this antibiotic treating and how do you know I have that infection?    What side effects might occur from this  antibiotic?    How long will I need to take this antibiotic?    Is it safe to take this antibiotic with other medications or supplements (e.g., vitamins) that I am taking?     Are there any special directions I need to know about taking this antibiotic? For example, should I take it with food?    How will I be monitored to know whether my infection is responding to the antibiotic?    What tests may help to make sure the right antibiotic is prescribed for me?      Information provided by:  www.cdc.gov/getsmart  U.S. Department of Health and Human Services  Centers for disease Control and Prevention  National Center for Emerging and Zoonotic Infectious Diseases  Division of Healthcare Quality Promotion         Protect others around you: Learn how to safely use, store and throw away your medicines at www.disposemymeds.org.             Medication List: This is a list of all your medications and when to take them. Check marks below indicate your daily home schedule. Keep this list as a reference.      Medications           Morning Afternoon Evening Bedtime As Needed    albuterol 90 MCG/ACT inhaler   Inhale 2 puffs into the lungs every 4 hours as needed.                                cetirizine 10 MG tablet   Commonly known as:  zyrTEC   Take 1 tablet (10 mg) by mouth every evening                                fluticasone 50 MCG/ACT spray   Commonly known as:  FLONASE   Spray 1-2 sprays into both nostrils daily                                prenatal multivitamin plus iron 27-0.8 MG Tabs per tablet   Take 1 tablet by mouth daily                                sertraline 100 MG tablet   Commonly known as:  ZOLOFT   Take 1 tablet (100 mg) by mouth daily                                VALTREX PO

## 2017-12-07 NOTE — IP AVS SNAPSHOT
[facility] Labor and Delivery    [address]    [facility] [address]    Phone:  [phone]    Fax:  [phone]                                       After Visit Summary   12/7/2017    Luba Vargas    MRN: [id number]           After Visit Summary Signature Page     I have received my discharge instructions, and my questions have been answered. I have discussed any challenges I see with this plan with the nurse or doctor.    ..........................................................................................................................................  Patient/Patient Representative Signature      ..........................................................................................................................................  Patient Representative Print Name and Relationship to Patient    ..................................................               ................................................  Date                                            Time    ..........................................................................................................................................  Reviewed by Signature/Title    ...................................................              ..............................................  Date                                                            Time

## 2017-12-07 NOTE — PROVIDER NOTIFICATION
Dr. Torres updated re: status and labs.  Ok to d/c home.  Encourage patient to try some Tylenol for her HA and followup in office Monday.  Reviewed with patient and stated understanding.

## 2017-12-07 NOTE — DISCHARGE INSTRUCTIONS
Discharge Instruction for Undelivered Patients      You were seen for: Fetal Assessment  We Consulted: Dr. Torres  You had (Test or Medicine):fetal monitoring, labs (normal)     Diet:   Drink 8 to 12 glasses of liquids (milk, juice, water) every day.  You may eat meals and snacks.     Activity:  Call your doctor or nurse midwife if your baby is moving less than usual.     Call your provider if you notice:  Swelling in your face or increased swelling in your hands or legs.  Headaches that are not relieved by Tylenol (acetaminophen).  Changes in your vision (blurring: seeing spots or stars.)  Nausea (sick to your stomach) and vomiting (throwing up).   Weight gain of 5 pounds or more per week.  Heartburn that doesn't go away.  Signs of bladder infection: pain when you urinate (use the toilet), need to go more often and more urgently.  The bag of agustin (rupture of membranes) breaks, or you notice leaking in your underwear.  Bright red blood in your underwear.  Abdominal (lower belly) or stomach pain.  For first baby: Contractions (tightening) less than 5 minutes apart for one hour or more.  Increase or change in vaginal discharge (note the color and amount)      Follow-up:  As scheduled in the clinic

## 2017-12-08 ENCOUNTER — HOSPITAL ENCOUNTER (OUTPATIENT)
Facility: CLINIC | Age: 32
Discharge: HOME | End: 2017-12-08
Attending: OBSTETRICS & GYNECOLOGY | Admitting: OBSTETRICS & GYNECOLOGY
Payer: COMMERCIAL

## 2017-12-08 VITALS — RESPIRATION RATE: 16 BRPM | DIASTOLIC BLOOD PRESSURE: 61 MMHG | TEMPERATURE: 99.1 F | SYSTOLIC BLOOD PRESSURE: 110 MMHG

## 2017-12-08 PROBLEM — Z36.89 ENCOUNTER FOR TRIAGE IN PREGNANT PATIENT: Status: ACTIVE | Noted: 2017-12-08

## 2017-12-08 PROCEDURE — 99213 OFFICE O/P EST LOW 20 MIN: CPT

## 2017-12-08 NOTE — IP AVS SNAPSHOT
[facility] Labor and Delivery    [address]    [facility] [address]    Phone:  [phone]    Fax:  [phone]                                       After Visit Summary   12/8/2017    Luba Vargas    MRN: [id number]           After Visit Summary Signature Page     I have received my discharge instructions, and my questions have been answered. I have discussed any challenges I see with this plan with the nurse or doctor.    ..........................................................................................................................................  Patient/Patient Representative Signature      ..........................................................................................................................................  Patient Representative Print Name and Relationship to Patient    ..................................................               ................................................  Date                                            Time    ..........................................................................................................................................  Reviewed by Signature/Title    ...................................................              ..............................................  Date                                                            Time

## 2017-12-08 NOTE — PROVIDER NOTIFICATION
12/08/17 3516   Provider Notification   Provider Name/Title Dr Mcgarry   Method of Notification Phone   Request Evaluate - Remote   Notification Reason Patient Arrived     MD updated on pt arrival, assessment and that she was here yesterday with same complaint. Due to pt being here less than 24 hours ago and current BPs orders to discharge home. Encourage fluid hydration, tylenol extra strength, and light caffeine. Schedule appointment for Monday and bring personal BP cuff with.

## 2017-12-08 NOTE — PLAN OF CARE
Discharge instructions reviewed with pt. Pt verbalizes understanding and declines any further questions. Pt ambulated off unit undelivered at 1311

## 2017-12-08 NOTE — DISCHARGE INSTRUCTIONS
Discharge Instruction for Undelivered Patients      You were seen for: Blood pressure assessment   We Consulted: Dr Janelle Mcgarry  You had (Test or Medicine):BPs and fetal monitoring      Diet:   Drink 8 to 12 glasses of liquids (milk, juice, water) every day.     Activity:  Call your doctor or nurse midwife if your baby is moving less than usual.     Call your provider if you notice:  Swelling in your face or increased swelling in your hands or legs.  Headaches that are not relieved by Tylenol (acetaminophen).  Changes in your vision (blurring: seeing spots or stars.)  Nausea (sick to your stomach) and vomiting (throwing up).   Weight gain of 5 pounds or more per week.  Heartburn that doesn't go away.  Signs of bladder infection: pain when you urinate (use the toilet), need to go more often and more urgently.  The bag of agustin (rupture of membranes) breaks, or you notice leaking in your underwear.  Bright red blood in your underwear.  Abdominal (lower belly) or stomach pain.  For first baby: Contractions (tightening) less than 5 minutes apart for one hour or more.  Second (plus) baby: Contractions (tightening) less than 10 minutes apart and getting stronger.  *If less than 34 weeks: Contractions (tightenings) more than 6 times in one hour.  Increase or change in vaginal discharge (note the color and amount)    Follow-up:  Make an appointment to be seen on Monday. Bring personal blood pressure cuff with to use in clinic to compare.

## 2017-12-08 NOTE — PLAN OF CARE
at 36.6 weeks come to hospital for increased headache from last night. Says she has been taken tylenol for pain control and she last took that at 0400, it does not work. Denies vaginal bleeding or leaking of fluid. Positive fetal movement. Monitors applied, assessment done and admission information gathered. Will call MD for further orders.

## 2017-12-08 NOTE — IP AVS SNAPSHOT
MRN:[id number]                      After Visit Summary   12/8/2017    Luba Vargas    MRN: [id number]           Thank you!     Thank you for choosing [facility] for your care. Our goal is always to provide you with excellent care. Hearing back from our patients is one way we can continue to improve our services. Please take a few minutes to complete the written survey that you may receive in the mail after you visit. If you would like to speak to someone directly about your visit please contact Patient Relations at [phone]. Thank you!          Patient Information     Date Of Birth          1985        About your hospital stay     You were admitted on:  December 8, 2017 You last received care in the:  [facility] Labor and Delivery    You were discharged on:  December 8, 2017       Who to Call     For medical emergencies, please call 911.  For non-urgent questions about your medical care, please call your primary care provider or clinic, [phone]          Attending Provider     Provider Specialty    Janelle Mcgarry MD OB/Gyn       Primary Care Provider Office Phone # Fax #    Heide Blackman [phone]      Further instructions from your care team       Discharge Instruction for Undelivered Patients      You were seen for: Blood pressure assessment   We Consulted: Dr Janelle Mcgarry  You had (Test or Medicine):BPs and fetal monitoring      Diet:   Drink 8 to 12 glasses of liquids (milk, juice, water) every day.     Activity:  Call your doctor or nurse midwife if your baby is moving less than usual.     Call your provider if you notice:  Swelling in your face or increased swelling in your hands or legs.  Headaches that are not relieved by Tylenol (acetaminophen).  Changes in your vision (blurring: seeing spots or stars.)  Nausea (sick to your stomach) and vomiting (throwing up).   Weight gain of 5 pounds or more per week.  Heartburn that  "doesn't go away.  Signs of bladder infection: pain when you urinate (use the toilet), need to go more often and more urgently.  The bag of agustin (rupture of membranes) breaks, or you notice leaking in your underwear.  Bright red blood in your underwear.  Abdominal (lower belly) or stomach pain.  For first baby: Contractions (tightening) less than 5 minutes apart for one hour or more.  Second (plus) baby: Contractions (tightening) less than 10 minutes apart and getting stronger.  *If less than 34 weeks: Contractions (tightenings) more than 6 times in one hour.  Increase or change in vaginal discharge (note the color and amount)    Follow-up:  Make an appointment to be seen on Monday. Bring personal blood pressure cuff with to use in clinic to compare.           Pending Results     No orders found from 2017 to 2017.            Admission Information     Date & Time Provider Department Dept. Phone    2017 Janelle Mcgarry MD [facility] Labor and Delivery [phone]      Your Vitals Were     Blood Pressure Temperature Respirations Last Period          110/59 99.1  F (37.3  C) (Oral) 16 2017 (Exact Date)        MyChart Information     [employer] lets you send messages to your doctor, view your test results, renew your prescriptions, schedule appointments and more. To sign up, go to [email or web address] . Click on \"Log in\" on the left side of the screen, which will take you to the Welcome page. Then click on \"Sign up Now\" on the right side of the page.     You will be asked to enter the access code listed below, as well as some personal information. Please follow the directions to create your username and password.     Your access code is: [email or web address]  Expires: 2017 10:41 AM     Your access code will  in 90 days. If you need help or a new code, please call your [place] clinic or [phone].        Care EveryWhere ID     This is your Care EveryWhere ID. This could be used by " other organizations to access your [place] medical records  [id number]        Equal Access to Services     KRISSCAROL JAN : Hadjudah knox Soestelle, wapaolada lugurinder, qashannanta keyurmerricktamiko barber, mer garciakeiraalcon engel. So [facility] [phone].    ATENCIÓN: Si habla español, tiene a santana disposición servicios gratuitos de asistencia lingüística. Llame al [phone].    We comply with applicable federal civil rights laws and [place] laws. We do not discriminate on the basis of race, color, national origin, age, disability, sex, sexual orientation, or gender identity.               Review of your medicines      UNREVIEWED medicines. Ask your doctor about these medicines        Dose / Directions    albuterol 90 MCG/ACT inhaler        Dose:  2 puff   Inhale 2 puffs into the lungs every 4 hours as needed.   Refills:  0       cetirizine 10 MG tablet   Commonly known as:  zyrTEC   Used for:  Amenorrhea        Dose:  10 mg   Take 1 tablet (10 mg) by mouth every evening   Quantity:  30 tablet   Refills:  1       fluticasone 50 MCG/ACT spray   Commonly known as:  FLONASE   Used for:  Amenorrhea        Dose:  1-2 spray   Spray 1-2 sprays into both nostrils daily   Quantity:  3 Bottle   Refills:  11       prenatal multivitamin plus iron 27-0.8 MG Tabs per tablet   Used for:  Amenorrhea        Dose:  1 tablet   Take 1 tablet by mouth daily   Quantity:  100 tablet   Refills:  3       sertraline 100 MG tablet   Commonly known as:  ZOLOFT   Used for:  Amenorrhea        Dose:  100 mg   Take 1 tablet (100 mg) by mouth daily   Quantity:  90 tablet   Refills:  1       VALTREX PO        Refills:  0                Protect others around you: Learn how to safely use, store and throw away your medicines at www.disposemymeds.org.             Medication List: This is a list of all your medications and when to take them. Check marks below indicate your daily home schedule. Keep this list as a reference.      Medications            Morning Afternoon Evening Bedtime As Needed    albuterol 90 MCG/ACT inhaler   Inhale 2 puffs into the lungs every 4 hours as needed.                                cetirizine 10 MG tablet   Commonly known as:  zyrTEC   Take 1 tablet (10 mg) by mouth every evening                                fluticasone 50 MCG/ACT spray   Commonly known as:  FLONASE   Spray 1-2 sprays into both nostrils daily                                prenatal multivitamin plus iron 27-0.8 MG Tabs per tablet   Take 1 tablet by mouth daily                                sertraline 100 MG tablet   Commonly known as:  ZOLOFT   Take 1 tablet (100 mg) by mouth daily                                VALTREX PO

## 2017-12-14 ENCOUNTER — HOSPITAL ENCOUNTER (INPATIENT)
Facility: CLINIC | Age: 32
LOS: 4 days | Discharge: HOME HEALTH | End: 2017-12-18
Attending: OBSTETRICS & GYNECOLOGY | Admitting: OBSTETRICS & GYNECOLOGY
Payer: COMMERCIAL

## 2017-12-14 PROBLEM — O09.90 SUPERVISION OF HIGH-RISK PREGNANCY: Status: ACTIVE | Noted: 2017-12-14

## 2017-12-14 LAB
ALT SERPL W P-5'-P-CCNC: 16 U/L (ref 0–50)
AST SERPL W P-5'-P-CCNC: 20 U/L (ref 0–45)
CREAT UR-MCNC: 127 MG/DL
ERYTHROCYTE [DISTWIDTH] IN BLOOD BY AUTOMATED COUNT: 14.3 % (ref 10–15)
HCT VFR BLD AUTO: 37 % (ref 35–47)
HGB BLD-MCNC: 12.3 G/DL (ref 11.7–15.7)
MCH RBC QN AUTO: 29.2 PG (ref 26.5–33)
MCHC RBC AUTO-ENTMCNC: 33.2 G/DL (ref 31.5–36.5)
MCV RBC AUTO: 88 FL (ref 78–100)
PLATELET # BLD AUTO: 218 10E9/L (ref 150–450)
PROT UR-MCNC: 0.56 G/L
PROT/CREAT 24H UR: 0.44 G/G CR (ref 0–0.2)
RBC # BLD AUTO: 4.21 10E12/L (ref 3.8–5.2)
URATE SERPL-MCNC: 5.3 MG/DL (ref 2.6–6)
WBC # BLD AUTO: 9 10E9/L (ref 4–11)

## 2017-12-14 PROCEDURE — 84460 ALANINE AMINO (ALT) (SGPT): CPT | Performed by: OBSTETRICS & GYNECOLOGY

## 2017-12-14 PROCEDURE — 25000132 ZZH RX MED GY IP 250 OP 250 PS 637: Performed by: OBSTETRICS & GYNECOLOGY

## 2017-12-14 PROCEDURE — 12000031 ZZH R&B OB CRITICAL

## 2017-12-14 PROCEDURE — 85027 COMPLETE CBC AUTOMATED: CPT | Performed by: OBSTETRICS & GYNECOLOGY

## 2017-12-14 PROCEDURE — 84156 ASSAY OF PROTEIN URINE: CPT | Performed by: OBSTETRICS & GYNECOLOGY

## 2017-12-14 PROCEDURE — 84550 ASSAY OF BLOOD/URIC ACID: CPT | Performed by: OBSTETRICS & GYNECOLOGY

## 2017-12-14 PROCEDURE — 84450 TRANSFERASE (AST) (SGOT): CPT | Performed by: OBSTETRICS & GYNECOLOGY

## 2017-12-14 PROCEDURE — 3E0P7VZ INTRODUCTION OF HORMONE INTO FEMALE REPRODUCTIVE, VIA NATURAL OR ARTIFICIAL OPENING: ICD-10-PCS | Performed by: OBSTETRICS & GYNECOLOGY

## 2017-12-14 RX ORDER — ZOLPIDEM TARTRATE 5 MG/1
5 TABLET ORAL
Status: DISCONTINUED | OUTPATIENT
Start: 2017-12-14 | End: 2017-12-18 | Stop reason: HOSPADM

## 2017-12-14 RX ORDER — ACETAMINOPHEN 325 MG/1
975 TABLET ORAL EVERY 8 HOURS PRN
Status: DISCONTINUED | OUTPATIENT
Start: 2017-12-14 | End: 2017-12-16 | Stop reason: ALTCHOICE

## 2017-12-14 RX ORDER — MISOPROSTOL 100 UG/1
25 TABLET ORAL
Status: DISCONTINUED | OUTPATIENT
Start: 2017-12-14 | End: 2017-12-15

## 2017-12-14 RX ADMIN — Medication 25 MCG: at 22:30

## 2017-12-14 RX ADMIN — Medication 25 MCG: at 20:30

## 2017-12-14 RX ADMIN — Medication 25 MCG: at 18:22

## 2017-12-14 RX ADMIN — ZOLPIDEM TARTRATE 5 MG: 5 TABLET, FILM COATED ORAL at 21:51

## 2017-12-14 NOTE — IP AVS SNAPSHOT
MRN:[id number]                      After Visit Summary   12/14/2017    Luba Vargas    MRN: [id number]           Thank you!     Thank you for choosing [facility] for your care. Our goal is always to provide you with excellent care. Hearing back from our patients is one way we can continue to improve our services. Please take a few minutes to complete the written survey that you may receive in the mail after you visit. If you would like to speak to someone directly about your visit please contact Patient Relations at [phone]. Thank you!          Patient Information     Date Of Birth          1985        About your hospital stay     You were admitted on:  December 14, 2017 You last received care in the:  [facility]    You were discharged on:  December 18, 2017       Who to Call     For medical emergencies, please call 911.  For non-urgent questions about your medical care, please call your primary care provider or clinic, [phone]          Attending Provider     Provider Specialty    Griselda Santo APRN CNM Midwives    Kamila Rosen MD OB/Gyn    Janelle Mcgarry MD OB/Gyn    Vamshi, Faye MEJIA MD OB/Gyn       Primary Care Provider Office Phone # Fax #    Heide Blackman [phone]      Further instructions from your care team       Postpartum Vaginal Delivery Instructions  BP check in clinic in 1-2 weeks. Post partum check in 6 weeks. [facility]     Activity       Ask family and friends for help when you need it.    Do not place anything in your vagina for 6 weeks.    You are not restricted on other activities, but take it easy for a few weeks to allow your body to recover from delivery.  You are able to do any activities you feel up to that point.    No driving until you have stopped taking your pain medications (usually two weeks after delivery).     Call your health care provider if you have  "any of these symptoms:       Increased pain, swelling, redness, or fluid around your stiches from an episiotomy or perineal tear.    A fever above 100.4 F (38 C) with or without chills when placing a thermometer under your tongue.    You soak a sanitary pad with blood within 1 hour, or you see blood clots larger than a golf ball.    Bleeding that lasts more than 6 weeks.    Vaginal discharge that smells bad.    Severe pain, cramping or tenderness in your lower belly area.    A need to urinate more frequently (use the toilet more often), more urgently (use the toilet very quickly), or it burns when you urinate.    Nausea and vomiting.    Redness, swelling or pain around a vein in your leg.    Problems breastfeeding or a red or painful area on your breast.    Chest pain and cough or are gasping for air.    Problems coping with sadness, anxiety, or depression.  If you have any concerns about hurting yourself or the baby, call your provider immediately.     You have questions or concerns after you return home.     Keep your hands clean:  Always wash your hands before touching your perineal area and stitches.  This helps reduce your risk of infection.  If your hands aren't dirty, you may use an alcohol hand-rub to clean your hands. Keep your nails clean and short.        Pending Results     No orders found from 12/12/2017 to 12/15/2017.            Statement of Approval     Ordered          12/18/17 0948  I have reviewed and agree with all the recommendations and orders detailed in this document.  EFFECTIVE NOW     Approved and electronically signed by:  Gayla Beckford MD             Admission Information     Date & Time Provider Department Dept. Phone    12/14/2017 Faye Bonds MD [facility] Postpartum [phone]      Your Vitals Were     Blood Pressure Pulse Temperature Respirations Height Weight    131/76 79 97.9  F (36.6  C) (Oral) 18 1.626 m (5' 4\") 97.1 kg (214 lb)    Last Period BMI " "(Body Mass Index)                2017 (Exact Date) 36.73 kg/m2          [email or web address] Information     [email or web address] lets you send messages to your doctor, view your test results, renew your prescriptions, schedule appointments and more. To sign up, go to [email or web address] . Click on \"Log in\" on the left side of the screen, which will take you to the Welcome page. Then click on \"Sign up Now\" on the right side of the page.     You will be asked to enter the access code listed below, as well as some personal information. Please follow the directions to create your username and password.     Your access code is: [email or web address]  Expires: 3/18/2018 12:15 PM     Your access code will  in 90 days. If you need help or a new code, please call your [place] clinic or [phone].        Care EveryWhere ID     This is your Care EveryWhere ID. This could be used by other organizations to access your [facility] medical records  [id number]        Equal Access to Services     [facility]: Hadii martinez knox Soestelle, waaxda lugurinder, qaybta kaalmatamiko barber, mer shankar . So [facility] [place] [phone].    ATENCIÓN: Si habla español, tiene a santana disposición servicios gratuitos de asistencia lingüística. Llame al [phone].    We comply with applicable federal civil rights laws and [place] laws. We do not discriminate on the basis of race, color, national origin, age, disability, sex, sexual orientation, or gender identity.               Review of your medicines      CONTINUE these medicines which have NOT CHANGED        Dose / Directions    albuterol 90 MCG/ACT inhaler        Dose:  2 puff   Inhale 2 puffs into the lungs every 4 hours as needed.   Refills:  0       cetirizine 10 MG tablet   Commonly known as:  zyrTEC   Used for:  Amenorrhea        Dose:  10 mg   Take 1 tablet (10 mg) by mouth every evening   Quantity:  30 tablet   Refills:  1       fluticasone 50 MCG/ACT spray   Commonly known as:  " FLONASE   Used for:  Amenorrhea        Dose:  1-2 spray   Spray 1-2 sprays into both nostrils daily   Quantity:  3 Bottle   Refills:  11       prenatal multivitamin plus iron 27-0.8 MG Tabs per tablet   Used for:  Amenorrhea        Dose:  1 tablet   Take 1 tablet by mouth daily   Quantity:  100 tablet   Refills:  3       sertraline 100 MG tablet   Commonly known as:  ZOLOFT   Used for:  Amenorrhea        Dose:  50 mg   Take 50 mg by mouth daily   Quantity:  90 tablet   Refills:  1         STOP taking     VALTREX PO                    Protect others around you: Learn how to safely use, store and throw away your medicines at www.disposemymeds.org.             Medication List: This is a list of all your medications and when to take them. Check marks below indicate your daily home schedule. Keep this list as a reference.      Medications           Morning Afternoon Evening Bedtime As Needed    albuterol 90 MCG/ACT inhaler   Inhale 2 puffs into the lungs every 4 hours as needed.                                cetirizine 10 MG tablet   Commonly known as:  zyrTEC   Take 1 tablet (10 mg) by mouth every evening   Last time this was given:  10 mg on 12/18/2017  8:53 AM                                fluticasone 50 MCG/ACT spray   Commonly known as:  FLONASE   Spray 1-2 sprays into both nostrils daily                                prenatal multivitamin plus iron 27-0.8 MG Tabs per tablet   Take 1 tablet by mouth daily                                sertraline 100 MG tablet   Commonly known as:  ZOLOFT   Take 50 mg by mouth daily   Last time this was given:  50 mg on 12/18/2017  8:53 AM

## 2017-12-14 NOTE — PROVIDER NOTIFICATION
12/14/17 1731   Provider Notification   Provider Name/Title LEON   Method of Notification Phone   Request Evaluate - Remote   Notification Reason Status Update   ORDERS RECEIVED.  PT AGREES TO POC

## 2017-12-14 NOTE — PLAN OF CARE
Problem: Patient Care Overview  Goal: Plan of Care/Patient Progress Review  Data: [facility]  4:02 [facility].  Reason for maternal/fetal assessment is hypertension disorder of pregnancy. Patient reports she had a headache this am for 1 hour.  She arrived from the clinic following a prenatal visit for serial blood pressures with blood and urine specimens that were obtained in the office at 1515.   Patient is a .  Prenatal record reviewed. Pregnancy  has been complicated by gestational hypertension.  Gestational Age 37w5d. VSS. Fetal movement present. Patient denies uterine contractions, leaking of vaginal fluid/rupture of membranes, vaginal bleeding, abdominal pain, nausea, vomiting, headache, visual disturbances, epigastric or URQ pain, significant edema. Support person is not present.   Action: Verbal consent for EFM. Triage assessment completed. Bill of rights reviewed.  Response: Patient verbalized agreement with plan. Will contact Dr Kamila Rosen with update and further orders.

## 2017-12-14 NOTE — IP AVS SNAPSHOT
[facility]    [address]    [facility] [address]    Phone:  [phone]    Fax:  [phone]                                       After Visit Summary   12/14/2017    Luba Vargas    MRN: [id number]           After Visit Summary Signature Page     I have received my discharge instructions, and my questions have been answered. I have discussed any challenges I see with this plan with the nurse or doctor.    ..........................................................................................................................................  Patient/Patient Representative Signature      ..........................................................................................................................................  Patient Representative Print Name and Relationship to Patient    ..................................................               ................................................  Date                                            Time    ..........................................................................................................................................  Reviewed by Signature/Title    ...................................................              ..............................................  Date                                                            Time

## 2017-12-15 ENCOUNTER — ANESTHESIA EVENT (OUTPATIENT)
Dept: OBGYN | Facility: CLINIC | Age: 32
End: 2017-12-15
Payer: COMMERCIAL

## 2017-12-15 ENCOUNTER — ANESTHESIA (OUTPATIENT)
Dept: OBGYN | Facility: CLINIC | Age: 32
End: 2017-12-15
Payer: COMMERCIAL

## 2017-12-15 LAB
ABO + RH BLD: NORMAL
ABO + RH BLD: NORMAL
ALT SERPL W P-5'-P-CCNC: 15 U/L (ref 0–50)
AST SERPL W P-5'-P-CCNC: 16 U/L (ref 0–45)
BASOPHILS # BLD AUTO: 0.1 10E9/L (ref 0–0.2)
BASOPHILS NFR BLD AUTO: 0.8 %
DIFFERENTIAL METHOD BLD: NORMAL
EOSINOPHIL # BLD AUTO: 0.3 10E9/L (ref 0–0.7)
EOSINOPHIL NFR BLD AUTO: 2.8 %
ERYTHROCYTE [DISTWIDTH] IN BLOOD BY AUTOMATED COUNT: 14.3 % (ref 10–15)
HCT VFR BLD AUTO: 38.3 % (ref 35–47)
HGB BLD-MCNC: 12.6 G/DL (ref 11.7–15.7)
IMM GRANULOCYTES # BLD: 0.1 10E9/L (ref 0–0.4)
IMM GRANULOCYTES NFR BLD: 0.9 %
LYMPHOCYTES # BLD AUTO: 2.9 10E9/L (ref 0.8–5.3)
LYMPHOCYTES NFR BLD AUTO: 31.5 %
MCH RBC QN AUTO: 29.2 PG (ref 26.5–33)
MCHC RBC AUTO-ENTMCNC: 32.9 G/DL (ref 31.5–36.5)
MCV RBC AUTO: 89 FL (ref 78–100)
MONOCYTES # BLD AUTO: 0.5 10E9/L (ref 0–1.3)
MONOCYTES NFR BLD AUTO: 5 %
NEUTROPHILS # BLD AUTO: 5.4 10E9/L (ref 1.6–8.3)
NEUTROPHILS NFR BLD AUTO: 59 %
NRBC # BLD AUTO: 0 10*3/UL
NRBC BLD AUTO-RTO: 0 /100
PLATELET # BLD AUTO: 208 10E9/L (ref 150–450)
RBC # BLD AUTO: 4.31 10E12/L (ref 3.8–5.2)
SPECIMEN EXP DATE BLD: NORMAL
T PALLIDUM IGG+IGM SER QL: NEGATIVE
WBC # BLD AUTO: 9.2 10E9/L (ref 4–11)

## 2017-12-15 PROCEDURE — 36415 COLL VENOUS BLD VENIPUNCTURE: CPT | Performed by: OBSTETRICS & GYNECOLOGY

## 2017-12-15 PROCEDURE — 12000031 ZZH R&B OB CRITICAL

## 2017-12-15 PROCEDURE — 25000125 ZZHC RX 250: Performed by: OBSTETRICS & GYNECOLOGY

## 2017-12-15 PROCEDURE — 10907ZC DRAINAGE OF AMNIOTIC FLUID, THERAPEUTIC FROM PRODUCTS OF CONCEPTION, VIA NATURAL OR ARTIFICIAL OPENING: ICD-10-PCS | Performed by: OBSTETRICS & GYNECOLOGY

## 2017-12-15 PROCEDURE — 86900 BLOOD TYPING SEROLOGIC ABO: CPT | Performed by: OBSTETRICS & GYNECOLOGY

## 2017-12-15 PROCEDURE — 25000125 ZZHC RX 250

## 2017-12-15 PROCEDURE — 3E033VJ INTRODUCTION OF OTHER HORMONE INTO PERIPHERAL VEIN, PERCUTANEOUS APPROACH: ICD-10-PCS | Performed by: OBSTETRICS & GYNECOLOGY

## 2017-12-15 PROCEDURE — 3E0R3BZ INTRODUCTION OF ANESTHETIC AGENT INTO SPINAL CANAL, PERCUTANEOUS APPROACH: ICD-10-PCS | Performed by: ANESTHESIOLOGY

## 2017-12-15 PROCEDURE — 40000671 ZZH STATISTIC ANESTHESIA CASE

## 2017-12-15 PROCEDURE — 27110038 ZZH RX 271

## 2017-12-15 PROCEDURE — 37000011 ZZH ANESTHESIA WARD SERVICE

## 2017-12-15 PROCEDURE — 25000132 ZZH RX MED GY IP 250 OP 250 PS 637: Performed by: OBSTETRICS & GYNECOLOGY

## 2017-12-15 PROCEDURE — 84450 TRANSFERASE (AST) (SGOT): CPT | Performed by: OBSTETRICS & GYNECOLOGY

## 2017-12-15 PROCEDURE — 00HU33Z INSERTION OF INFUSION DEVICE INTO SPINAL CANAL, PERCUTANEOUS APPROACH: ICD-10-PCS | Performed by: ANESTHESIOLOGY

## 2017-12-15 PROCEDURE — 25000128 H RX IP 250 OP 636: Performed by: OBSTETRICS & GYNECOLOGY

## 2017-12-15 PROCEDURE — 86901 BLOOD TYPING SEROLOGIC RH(D): CPT | Performed by: OBSTETRICS & GYNECOLOGY

## 2017-12-15 PROCEDURE — 84460 ALANINE AMINO (ALT) (SGPT): CPT | Performed by: OBSTETRICS & GYNECOLOGY

## 2017-12-15 PROCEDURE — 85025 COMPLETE CBC W/AUTO DIFF WBC: CPT | Performed by: OBSTETRICS & GYNECOLOGY

## 2017-12-15 PROCEDURE — 86780 TREPONEMA PALLIDUM: CPT | Performed by: OBSTETRICS & GYNECOLOGY

## 2017-12-15 PROCEDURE — 25000128 H RX IP 250 OP 636

## 2017-12-15 RX ORDER — IBUPROFEN 800 MG/1
800 TABLET, FILM COATED ORAL
Status: COMPLETED | OUTPATIENT
Start: 2017-12-15 | End: 2017-12-16

## 2017-12-15 RX ORDER — CARBOPROST TROMETHAMINE 250 UG/ML
250 INJECTION, SOLUTION INTRAMUSCULAR
Status: DISCONTINUED | OUTPATIENT
Start: 2017-12-15 | End: 2017-12-16 | Stop reason: CLARIF

## 2017-12-15 RX ORDER — SCOLOPAMINE TRANSDERMAL SYSTEM 1 MG/1
1 PATCH, EXTENDED RELEASE TRANSDERMAL ONCE
Status: DISCONTINUED | OUTPATIENT
Start: 2017-12-15 | End: 2017-12-16 | Stop reason: CLARIF

## 2017-12-15 RX ORDER — NALOXONE HYDROCHLORIDE 0.4 MG/ML
.1-.4 INJECTION, SOLUTION INTRAMUSCULAR; INTRAVENOUS; SUBCUTANEOUS
Status: DISCONTINUED | OUTPATIENT
Start: 2017-12-15 | End: 2017-12-16 | Stop reason: CLARIF

## 2017-12-15 RX ORDER — NALBUPHINE HYDROCHLORIDE 10 MG/ML
2.5-5 INJECTION, SOLUTION INTRAMUSCULAR; INTRAVENOUS; SUBCUTANEOUS EVERY 6 HOURS PRN
Status: DISCONTINUED | OUTPATIENT
Start: 2017-12-15 | End: 2017-12-16 | Stop reason: CLARIF

## 2017-12-15 RX ORDER — SODIUM CHLORIDE, SODIUM LACTATE, POTASSIUM CHLORIDE, CALCIUM CHLORIDE 600; 310; 30; 20 MG/100ML; MG/100ML; MG/100ML; MG/100ML
INJECTION, SOLUTION INTRAVENOUS CONTINUOUS
Status: DISCONTINUED | OUTPATIENT
Start: 2017-12-15 | End: 2017-12-16 | Stop reason: CLARIF

## 2017-12-15 RX ORDER — OXYTOCIN 10 [USP'U]/ML
10 INJECTION, SOLUTION INTRAMUSCULAR; INTRAVENOUS
Status: DISCONTINUED | OUTPATIENT
Start: 2017-12-15 | End: 2017-12-16 | Stop reason: CLARIF

## 2017-12-15 RX ORDER — METHYLERGONOVINE MALEATE 0.2 MG/ML
200 INJECTION INTRAVENOUS
Status: DISCONTINUED | OUTPATIENT
Start: 2017-12-15 | End: 2017-12-16 | Stop reason: CLARIF

## 2017-12-15 RX ORDER — LIDOCAINE 40 MG/G
CREAM TOPICAL
Status: DISCONTINUED | OUTPATIENT
Start: 2017-12-15 | End: 2017-12-16 | Stop reason: CLARIF

## 2017-12-15 RX ORDER — CETIRIZINE HYDROCHLORIDE 10 MG/1
10 TABLET ORAL DAILY
Status: DISCONTINUED | OUTPATIENT
Start: 2017-12-15 | End: 2017-12-18 | Stop reason: HOSPADM

## 2017-12-15 RX ORDER — OXYCODONE AND ACETAMINOPHEN 5; 325 MG/1; MG/1
1 TABLET ORAL
Status: DISCONTINUED | OUTPATIENT
Start: 2017-12-15 | End: 2017-12-16 | Stop reason: CLARIF

## 2017-12-15 RX ORDER — FENTANYL CITRATE 50 UG/ML
100 INJECTION, SOLUTION INTRAMUSCULAR; INTRAVENOUS ONCE
Status: COMPLETED | OUTPATIENT
Start: 2017-12-15 | End: 2017-12-15

## 2017-12-15 RX ORDER — OXYTOCIN/0.9 % SODIUM CHLORIDE 30/500 ML
1-24 PLASTIC BAG, INJECTION (ML) INTRAVENOUS CONTINUOUS
Status: DISCONTINUED | OUTPATIENT
Start: 2017-12-15 | End: 2017-12-16 | Stop reason: CLARIF

## 2017-12-15 RX ORDER — ACETAMINOPHEN 325 MG/1
650 TABLET ORAL EVERY 4 HOURS PRN
Status: DISCONTINUED | OUTPATIENT
Start: 2017-12-15 | End: 2017-12-16 | Stop reason: CLARIF

## 2017-12-15 RX ORDER — EPHEDRINE SULFATE 50 MG/ML
5 INJECTION, SOLUTION INTRAMUSCULAR; INTRAVENOUS; SUBCUTANEOUS
Status: DISCONTINUED | OUTPATIENT
Start: 2017-12-15 | End: 2017-12-16 | Stop reason: CLARIF

## 2017-12-15 RX ORDER — FENTANYL CITRATE 50 UG/ML
INJECTION, SOLUTION INTRAMUSCULAR; INTRAVENOUS
Status: COMPLETED
Start: 2017-12-15 | End: 2017-12-15

## 2017-12-15 RX ORDER — OXYTOCIN/0.9 % SODIUM CHLORIDE 30/500 ML
100-340 PLASTIC BAG, INJECTION (ML) INTRAVENOUS CONTINUOUS PRN
Status: DISCONTINUED | OUTPATIENT
Start: 2017-12-15 | End: 2017-12-16 | Stop reason: CLARIF

## 2017-12-15 RX ADMIN — Medication 25 MCG: at 11:04

## 2017-12-15 RX ADMIN — CETIRIZINE HYDROCHLORIDE 10 MG: 10 TABLET, FILM COATED ORAL at 08:00

## 2017-12-15 RX ADMIN — SODIUM CHLORIDE, POTASSIUM CHLORIDE, SODIUM LACTATE AND CALCIUM CHLORIDE: 600; 310; 30; 20 INJECTION, SOLUTION INTRAVENOUS at 16:27

## 2017-12-15 RX ADMIN — Medication 25 MCG: at 02:31

## 2017-12-15 RX ADMIN — Medication 25 MCG: at 04:37

## 2017-12-15 RX ADMIN — SERTRALINE HYDROCHLORIDE 50 MG: 50 TABLET ORAL at 08:00

## 2017-12-15 RX ADMIN — Medication 25 MCG: at 08:58

## 2017-12-15 RX ADMIN — Medication 25 MCG: at 00:31

## 2017-12-15 RX ADMIN — Medication 25 MCG: at 06:47

## 2017-12-15 RX ADMIN — OXYTOCIN-SODIUM CHLORIDE 0.9% IV SOLN 30 UNIT/500ML 2 MILLI-UNITS/MIN: 30-0.9/5 SOLUTION at 16:34

## 2017-12-15 RX ADMIN — Medication: at 23:16

## 2017-12-15 RX ADMIN — SODIUM CHLORIDE, POTASSIUM CHLORIDE, SODIUM LACTATE AND CALCIUM CHLORIDE 1000 ML: 600; 310; 30; 20 INJECTION, SOLUTION INTRAVENOUS at 21:30

## 2017-12-15 RX ADMIN — Medication 25 MCG: at 13:33

## 2017-12-15 RX ADMIN — FENTANYL CITRATE 100 MCG: 50 INJECTION INTRAMUSCULAR; INTRAVENOUS at 23:02

## 2017-12-15 RX ADMIN — FENTANYL CITRATE 100 MCG: 50 INJECTION, SOLUTION INTRAMUSCULAR; INTRAVENOUS at 23:02

## 2017-12-15 NOTE — PROVIDER NOTIFICATION
12/14/17 2142   Provider Notification   Provider Name/Title Dr. Rosen   Method of Notification Phone   Request Evaluate - Remote   Notification Reason Status Update   Dr. Rosen calling for an update. Updated on BP's, 2 doses of oral cytotec given, uterine activity, FHT's, and labs ordered for the AM. Update MD if BP's >160/110. No new orders.

## 2017-12-15 NOTE — PROVIDER NOTIFICATION
12/15/17 1327   Provider Notification   Provider Name/Title Dr Mcgarry   Method of Notification Phone   Request Evaluate - Remote   Notification Reason Status Update     Dr Mcgarry updated on cervical exam, irregular mild contractions, and category 1 tracing. MD states to go ahead with the next dose of oral cyotec. Recheck before next dose in two hours and call MD with update.

## 2017-12-15 NOTE — PLAN OF CARE
Problem: Patient Care Overview  Goal: Plan of Care/Patient Progress Review  Bedside report given to Maia LAGUERRE RN and sadi turned over at 1915

## 2017-12-15 NOTE — PLAN OF CARE
Report received from SERENA Carvalho.  Care assumed at this time.  Pt resting quietly in bed, denies needs/concerns at this time.  Discussed POC with pt and pt verbalized understanding.  Call light within reach, will continue to monitor.

## 2017-12-15 NOTE — PROVIDER NOTIFICATION
12/15/17 1121   Comments   Comments bedside report given to Greta OSORIO RN whom will assume all cares at this time

## 2017-12-15 NOTE — PROVIDER NOTIFICATION
12/15/17 1609   Provider Notification   Provider Name/Title Dr Mcgarry   Method of Notification Phone   Request Evaluate - Remote   Notification Reason Status Update     Dr Mcgarry updated re: minimal change in SVE, 3-4 min contractions, and reactive FHR. MD states to initiate pitocin to strengthen contractions.

## 2017-12-15 NOTE — PROGRESS NOTES
"OB Progress Note  12/15/2017  11:39 AM    S:  Pt with cramps and low back pain.      O:  /82  Temp 98.1  F (36.7  C) (Oral)  Resp 16  Ht 1.626 m (5' 4\")  Wt 97.1 kg (214 lb)  LMP 2017 (Exact Date)  BMI 36.73 kg/m2  EFM: baseline 140, accelerations present, no decelerations, moderate variability; Category 1  [place]:  Ctx occasional  SVE:  1/80%/-2, soft posterior  Membranes: intact    Labs this AM - AST 16  ALT 15   Hgb 12.6  Platelets 208    S/P oral cytotec x 8    A/P:  32 year old  @37w6d admitted with pre-eclampsia without severe feautures.  IOL.  Labs have been normal,. Elevated P/C ratio.  1.  Routine cares  2.  IOL - will give cytotec dose now (due).  Reassess after this dose.  AROM and pitocin planned when able.  3.  Pre-eclampsia - BPs stable, labs normal.  Would start magnesium if lab abnormalities or BP in severe range.  Continue to closely monitor.  4. GBS neg    Janelle Mcgarry    "

## 2017-12-15 NOTE — H&P
"OB Brief Admit H&P    No significant change in general health status based on examination of the patient, review of Nursing Admission Database and prenatal record.    Pt is a 32 year old  @ 37w5d who presented to L&D for evaluation of elevated BP in office, HA, LE edema, blurred vision.    Patient's prenatal course has been complicated by Hx LEEP ; Hx HSV- on valtrex prophylactically, no current sores or prodrome; Hx depression ,on Zoloft 50 mg/d, hx migraines.    Pt has had borderline BPs since about 32 weeks. Increased to 132/86 at 35 weeks. Pt followed closely since then with BPs and pre-e labs and urine.All labs normal, p/c ratios normal including on 17.Pt has had 1+ LE edema since 35 weeks.     Today pt was seen in the office by Dr. Ramirez and reported worsening LE edema, a mild HA and hx blurry vision at times. BP was 132/90, reflexes brisk, + LE edema and pt was sent to L&D for evaluation and labs    Prenatal Labs:    Blood type O pos  Rubella imm    GBS neg on       /81 ( 138/82, 138/83, 133/80, 143/84, 148/89) Temp 97.4  F (36.3  C) (Oral)  Ht 1.626 m (5' 4\")  Wt 97.1 kg (214 lb)  LMP 2017 (Exact Date)  BMI 36.73 kg/m2    No current HA or visual sx.     EFM:  Moderate variability and accels, no decels  [place]: no ctx  SVE: closed/40%/-3  Membranes:  Intact    Group B strep PCR [[id number]] Resulted: 17        Updated: 17 1851      Group B Strep PCR NEGATIVE     Protein random urine with Creat Ratio [[id number]] (Abnormal) Collected: 17 151     Specimen: Urine from Urine clean catch Updated: 17 171      Protein Random Urine 0.56 g/L       Protein Total Urine g/gr Creatinine 0.44 (H) g/g Cr      Creatinine urine calculation only [[id number]] Collected: 17 1515      Updated: 17      Creatinine Urine 127 mg/dL      Uric acid [[id number]] Collected: 17 1520     Specimen: Blood Updated: 17 1705      Uric Acid 5.3 mg/dL      " AST [[id number]] Collected: 17 1520     Specimen: Blood Updated: 17 1705      AST 20 U/L      ALT [[id number]] Collected: 17 1520     Specimen: Blood Updated: 17 1705      ALT 16 U/L      CBC with platelets [[id number]] Collected: 17 1520     Specimen: Blood Updated: 17 1651      WBC 9.0 10e9/L       RBC Count 4.21 10e12/L       Hemoglobin 12.3 g/dL       Hematocrit 37.0 %       MCV 88 fl       MCH 29.2 pg       MCHC 33.2 g/dL       RDW 14.3 %       Platelet Count 218 10e9/L              Assessment:  32 year old  @ 37w5d admitted for elevated BP. LFTs and platelets normal but p/c ratio now elevated consistent with developing pre-eclampsia without severe features. As pt is > 37 weeks delivery is recommended.     Plan:  1. Admit to labor and delivery   2. cytotec cervical ripening  3. IV access  4. Will hold on Magnesium sulfate at this time as BPs not in severe range and labs normal. If this changes will start pt on Magnesium. IV labetalol prn severe range BPs  5. Rpt labs in AM  6.Epidural prn  7.Plan reviewed with patient who voices her understanding and agreement. Possible lengthy ripening/induction of 1-2 days reviewed. Small risk respiratory distress in baby as < 39 weeks discussed.    Kamila Rosen  2017  7:00 PM

## 2017-12-15 NOTE — PROVIDER NOTIFICATION
12/15/17 0700   Provider Notification   Provider Name/Title Dr. Rosen   Method of Notification In Department   Dr. Rosen in department, clarified orders for home meds.  VORB for Zyrtec 10mg PO daily and Zoloft 50mg PO daily.  Dr. Rosen declines need to reorder valtrex at this time.

## 2017-12-16 PROCEDURE — 72200001 ZZH LABOR CARE VAGINAL DELIVERY SINGLE

## 2017-12-16 PROCEDURE — 25000132 ZZH RX MED GY IP 250 OP 250 PS 637: Performed by: OBSTETRICS & GYNECOLOGY

## 2017-12-16 PROCEDURE — 0HQ9XZZ REPAIR PERINEUM SKIN, EXTERNAL APPROACH: ICD-10-PCS | Performed by: OBSTETRICS & GYNECOLOGY

## 2017-12-16 PROCEDURE — 12000029 ZZH R&B OB INTERMEDIATE

## 2017-12-16 RX ORDER — LANOLIN 100 %
OINTMENT (GRAM) TOPICAL
Status: DISCONTINUED | OUTPATIENT
Start: 2017-12-16 | End: 2017-12-18 | Stop reason: HOSPADM

## 2017-12-16 RX ORDER — ACETAMINOPHEN 325 MG/1
650 TABLET ORAL EVERY 4 HOURS PRN
Status: DISCONTINUED | OUTPATIENT
Start: 2017-12-16 | End: 2017-12-18 | Stop reason: HOSPADM

## 2017-12-16 RX ORDER — BISACODYL 10 MG
10 SUPPOSITORY, RECTAL RECTAL DAILY PRN
Status: DISCONTINUED | OUTPATIENT
Start: 2017-12-18 | End: 2017-12-18 | Stop reason: HOSPADM

## 2017-12-16 RX ORDER — OXYTOCIN 10 [USP'U]/ML
10 INJECTION, SOLUTION INTRAMUSCULAR; INTRAVENOUS
Status: DISCONTINUED | OUTPATIENT
Start: 2017-12-16 | End: 2017-12-18 | Stop reason: HOSPADM

## 2017-12-16 RX ORDER — AMOXICILLIN 250 MG
2 CAPSULE ORAL 2 TIMES DAILY PRN
Status: DISCONTINUED | OUTPATIENT
Start: 2017-12-16 | End: 2017-12-18 | Stop reason: HOSPADM

## 2017-12-16 RX ORDER — OXYTOCIN/0.9 % SODIUM CHLORIDE 30/500 ML
100 PLASTIC BAG, INJECTION (ML) INTRAVENOUS CONTINUOUS
Status: DISCONTINUED | OUTPATIENT
Start: 2017-12-16 | End: 2017-12-18 | Stop reason: HOSPADM

## 2017-12-16 RX ORDER — MISOPROSTOL 200 UG/1
400 TABLET ORAL
Status: DISCONTINUED | OUTPATIENT
Start: 2017-12-16 | End: 2017-12-18 | Stop reason: HOSPADM

## 2017-12-16 RX ORDER — OXYTOCIN/0.9 % SODIUM CHLORIDE 30/500 ML
340 PLASTIC BAG, INJECTION (ML) INTRAVENOUS CONTINUOUS PRN
Status: DISCONTINUED | OUTPATIENT
Start: 2017-12-16 | End: 2017-12-18 | Stop reason: HOSPADM

## 2017-12-16 RX ORDER — HYDROCORTISONE 2.5 %
CREAM (GRAM) TOPICAL 3 TIMES DAILY PRN
Status: DISCONTINUED | OUTPATIENT
Start: 2017-12-16 | End: 2017-12-18 | Stop reason: HOSPADM

## 2017-12-16 RX ORDER — AMOXICILLIN 250 MG
1 CAPSULE ORAL 2 TIMES DAILY PRN
Status: DISCONTINUED | OUTPATIENT
Start: 2017-12-16 | End: 2017-12-18 | Stop reason: HOSPADM

## 2017-12-16 RX ORDER — IBUPROFEN 800 MG/1
800 TABLET, FILM COATED ORAL EVERY 6 HOURS PRN
Status: DISCONTINUED | OUTPATIENT
Start: 2017-12-16 | End: 2017-12-18 | Stop reason: HOSPADM

## 2017-12-16 RX ORDER — NALOXONE HYDROCHLORIDE 0.4 MG/ML
.1-.4 INJECTION, SOLUTION INTRAMUSCULAR; INTRAVENOUS; SUBCUTANEOUS
Status: DISCONTINUED | OUTPATIENT
Start: 2017-12-16 | End: 2017-12-18 | Stop reason: HOSPADM

## 2017-12-16 RX ADMIN — IBUPROFEN 800 MG: 800 TABLET, FILM COATED ORAL at 09:13

## 2017-12-16 RX ADMIN — IBUPROFEN 800 MG: 800 TABLET, FILM COATED ORAL at 02:39

## 2017-12-16 RX ADMIN — ACETAMINOPHEN 975 MG: 325 TABLET, FILM COATED ORAL at 07:26

## 2017-12-16 RX ADMIN — IBUPROFEN 800 MG: 800 TABLET, FILM COATED ORAL at 20:05

## 2017-12-16 RX ADMIN — SERTRALINE HYDROCHLORIDE 50 MG: 50 TABLET ORAL at 09:13

## 2017-12-16 RX ADMIN — CETIRIZINE HYDROCHLORIDE 10 MG: 10 TABLET, FILM COATED ORAL at 09:14

## 2017-12-16 RX ADMIN — SENNOSIDES AND DOCUSATE SODIUM 1 TABLET: 8.6; 5 TABLET ORAL at 07:58

## 2017-12-16 NOTE — PROVIDER NOTIFICATION
12/15/17 2131 12/15/17 2132   Vitals   BP (!) 140/101 (!) 151/105   Oximeter Heart Rate 94 bpm 81 bpm     Dr Bonds updated on blood pressures post cervical exam and AROM. MD states to call MD if these pressures continue or if bp is greater than 160/110.

## 2017-12-16 NOTE — PROVIDER NOTIFICATION
12/15/17 2341   Provider Notification   Provider Name/Title Dr. Bonds   Method of Notification Electronic Page   Request Evaluate - Remote   Notification Reason SVE     Dr. Bonds notified patient 10/100/0, comfortable with epidural, no urge to push.    11:41 PM  /Central  12/15/2017 [id number] No [id number] Message accepted

## 2017-12-16 NOTE — PROVIDER NOTIFICATION
12/16/17 0135   Provider Notification   Provider Name/Title Dr. Bonds   Method of Notification At Bedside   Request Evaluate in Person     Dr. oBnds bedside. Fetal strip since 2300 evaluated. MD to evaluate pushing effort and consider plan of care.

## 2017-12-16 NOTE — ANESTHESIA POSTPROCEDURE EVALUATION
Patient: Luba Vargas    * No procedures listed *    Diagnosis:* No pre-op diagnosis entered *  Diagnosis Additional Information: .Intrauterine Pregnancy, Labor      Anesthesia Type:  Epidural    Note:  Anesthesia Post Evaluation    Patient location during evaluation: Floor  Patient participation: Able to fully participate in evaluation  Level of consciousness: awake  Pain management: adequate  Airway patency: patent  Cardiovascular status: acceptable  Respiratory status: acceptable  Hydration status: euvolemic  PONV: controlled     Anesthetic complications: None    Comments: S/P epidural for labor. I or my partner remained immediately available, monitored the patient, and supervised nursing staff at key events and necessary intervals.    The patient is doing well. VSS Temp normal. Satisfactory cardiovascular and repiratory function. Neuro at baseline. Denies positional headache. Minimal side effects easily managed w/ PRN meds. No apparent anesthetic complications. No follow-up required.    [email or web address]        Last vitals:  Vitals:    12/16/17 0414 12/16/17 0600 12/16/17 0740   BP: 131/79 136/88 134/83   Pulse:  104 76   Resp:  16 18   Temp:  98.1  F (36.7  C)          Electronically Signed By: Abelino Leal MD  December 16, 2017  9:24 AM

## 2017-12-16 NOTE — PROVIDER NOTIFICATION
12/16/17 0126   Provider Notification   Provider Name/Title Dr. Bonds   Method of Notification Phone   Request Evaluate in Person   Notification Reason Decels;Uterine Activity     Dr. Bonds notified patient is pushing. Pushing stopped at 0115 to contact MD. Aware of interventions including and not limited to fluid bolus, o2, position change. Notified of recurrent lates and variables with moderate variability. Attempted pushing with every other contraction, open glottis pushing etc. MD will come and evaluate.

## 2017-12-16 NOTE — PLAN OF CARE
Problem: Patient Care Overview  Goal: Plan of Care/Patient Progress Review  Outcome: Improving  Tachy.  Otherwise other VSS.  Pain well controlled with tylenol.  Has voided prior to transfer.  Due to void around 0800.   Up SBA.  Breastfeeding with good latch.

## 2017-12-16 NOTE — PLAN OF CARE
Data: Kiwi vacuum applied at 0154 after verbal consent from the mother.  Action: 2 pulls at recommended pressure. Total time vacuum in use was 210 seconds. Number of pop-offs: 1. Additional staff present were: NICU team, MD, second RN.  Response: See delivery record. Positive bonding behaviors observed. Infant assessed - see Doc Flowsheets.

## 2017-12-16 NOTE — PROVIDER NOTIFICATION
12/15/17 4862   Provider Notification   Provider Name/Title Dr Dixon   Method of Notification Phone   Request Evaluate in Person   Notification Reason Pain;Patient Request     Dr Dixon updated re: pt would like an epidural. MD states he will be up in about 15 minutes.

## 2017-12-16 NOTE — ANESTHESIA PROCEDURE NOTES
Peripheral nerve/Neuraxial procedure note : epidural catheter  Pre-Procedure  Performed by BRYAN DIXON  Referred by GABRIELA CASEY MD  Location: OB      Pre-Anesthestic Checklist: patient identified, IV checked, risks and benefits discussed, informed consent, monitors and equipment checked, pre-op evaluation and at physician/surgeon's request    Timeout  Correct Patient: Yes   Correct Procedure: Yes   Correct Site: Yes   Correct Laterality: N/A   Correct Position: Yes   Site Marked: N/A   .   Procedure Documentation    .    Procedure:    Epidural catheter.     .  .       Assessment/Narrative  .  .  . Comments:  .Diagnosis- Anticipated vaginal delivery    Continuous Labor Epidural, indication is for labor pain. Following an discussion of the expectations, benefits and risk (including but not limited to nerve damage, infection, bleeding, spinal headache, partial or failed block)--questions were encouraged and answered. The patient appears to understand, and consents to proceed.     The patient received a fluid bolus per orders    Time-out performed.     The patient was in the sitting position, a PVP prep times three was done in the lumbar area followed by placement of a sterile drape. The skin was then infiltrated with lidocaine. A 17ga Touhy needle was then advanced under a loss of resistance technique until the epidural space was identified. The epidural catheter was then advanced and secured. The catheter was then bolused in divided doses with Bupivicaine 0.25% 12 cc plus Fentanyl 100mcg (2cc), while the patient/fetus was monitored. Infusion orders written and infusion of 0.125% bupivicaine 15cc per hour started.    I or my partner, was immediately available and frequently monitored at necessary intervals.      NOHEMY Dixon

## 2017-12-16 NOTE — PLAN OF CARE
Problem: Patient Care Overview  Goal: Plan of Care/Patient Progress Review  Outcome: Improving  VSS. Postpartum assessment WDL - see flowsheet. Pt up ad live and independent with self and infant cares. Voiding spontaneously without difficulty. Tolerating a general diet. Pt verbalized pain has been adequately controlled this shift with prn tylenol and ibuprofen.Second degree repair - declines ice.  Breastfeeding infant - encouraged pt to call for breastfeeding assistance. Continue to monitor.

## 2017-12-16 NOTE — ANESTHESIA PREPROCEDURE EVALUATION
PAC NOTE:       ANESTHESIA PRE EVALUATION:  Anesthesia Evaluation       history and physical reviewed .             ROS/MED HX    ENT/Pulmonary:     (+)asthma , . .    Neurologic:  - neg neurologic ROS     Cardiovascular:  - neg cardiovascular ROS       METS/Exercise Tolerance:     Hematologic:         Musculoskeletal:         GI/Hepatic:  - neg GI/hepatic ROS       Renal/Genitourinary:         Endo:         Psychiatric:         Infectious Disease:         Malignancy:         Other:                     Physical Exam  Normal systems: cardiovascular, pulmonary and dental    Airway   Mallampati: II    Dental     Cardiovascular       Pulmonary         neg OB ROS            Anesthesia Plan  Procedure only, no anesthetic delivered    History & Physical Review      ASA Status:  2 .  OB Epidural Asa: 2       Plan for Epidural          Postoperative Care      Consents  Anesthetic plan, risks, benefits and alternatives discussed with:  Patient..                            .

## 2017-12-16 NOTE — PROGRESS NOTES
"OB Progress Note  12/15/2017  7:04 PM    S:  Pt feeling moderate discomfort with contractions since pitocin titration has begun.  Denies headache, blurry vision, RUQ pain.    O:  /67  Temp 98.6  F (37  C) (Oral)  Resp 16  Ht 1.626 m (5' 4\")  Wt 97.1 kg (214 lb)  LMP 2017 (Exact Date)  BMI 36.73 kg/m2  EFM: baseline 120, accelerations present, no decelerations, moderate variability; Category 1  [place]:  Ctx q4 min  SVE:  1/80%/-2  Membranes: intact    Lab Results   Component Value Date    WBC 9.2 12/15/2017    HGB 12.6 12/15/2017    HCT 38.3 12/15/2017    MCV 89 12/15/2017     12/15/2017     Lab Results   Component Value Date    CR 0.71 2017     Lab Results   Component Value Date    AST 16 12/15/2017    ALT 15 12/15/2017     P/C ratio 0.44    Pitocin at 8 mU/min    A/P:  32 year old  @37w6d admitted with preeclampsia without severe features.  1.  Routine cares  2.  IOL - continue pitocin titration. If no cervical change after several more hours, will consider returning to cervical ripening.  3.  Preeclampsia without severe features - blood work normal on admission. No s/stx of severe disease.   4.  H/o HSV - no symptoms, no lesions  5.  Depression - on Zoloft  6.  Anticipate     Faye Bonds MD    "

## 2017-12-16 NOTE — PLAN OF CARE
Data: Luba Vargas transferred to [facility] via wheelchair. Baby transferred via parent's arms.  Action: Receiving unit notified of transfer: Yes. Patient and family notified of room change. Report given to SERENA Lares. Belongings sent to receiving unit. Accompanied by Registered Nurse. Oriented patient to surroundings. Call light within reach. ID bands double-checked with receiving RN.  Response: Patient tolerated transfer and is stable.

## 2017-12-17 LAB — HGB BLD-MCNC: 12.8 G/DL (ref 11.7–15.7)

## 2017-12-17 PROCEDURE — 85018 HEMOGLOBIN: CPT | Performed by: OBSTETRICS & GYNECOLOGY

## 2017-12-17 PROCEDURE — 25000132 ZZH RX MED GY IP 250 OP 250 PS 637: Performed by: OBSTETRICS & GYNECOLOGY

## 2017-12-17 PROCEDURE — 12000027 ZZH R&B OB

## 2017-12-17 PROCEDURE — 36415 COLL VENOUS BLD VENIPUNCTURE: CPT | Performed by: OBSTETRICS & GYNECOLOGY

## 2017-12-17 RX ADMIN — IBUPROFEN 800 MG: 800 TABLET, FILM COATED ORAL at 08:30

## 2017-12-17 RX ADMIN — IBUPROFEN 800 MG: 800 TABLET, FILM COATED ORAL at 21:47

## 2017-12-17 RX ADMIN — IBUPROFEN 800 MG: 800 TABLET, FILM COATED ORAL at 02:40

## 2017-12-17 RX ADMIN — SENNOSIDES AND DOCUSATE SODIUM 1 TABLET: 8.6; 5 TABLET ORAL at 08:30

## 2017-12-17 RX ADMIN — IBUPROFEN 800 MG: 800 TABLET, FILM COATED ORAL at 14:13

## 2017-12-17 RX ADMIN — CETIRIZINE HYDROCHLORIDE 10 MG: 10 TABLET, FILM COATED ORAL at 08:31

## 2017-12-17 RX ADMIN — SERTRALINE HYDROCHLORIDE 50 MG: 50 TABLET ORAL at 08:30

## 2017-12-17 NOTE — L&D DELIVERY NOTE
DATE OF PROCEDURE:  2017      BRIEF HISTORY:  Luba Vargas is a very pleasant 32-year-old female,  1, now para 1-0-0-1, who presented to Labor and Delivery at 37 weeks 5 days gestation for induction of labor secondary to elevated blood pressure, with laboratory work confirming preeclampsia without severe features.      LABOR AND DELIVERY:  The patient was examined on admission and found her to be with the cervix closed, and an estimated fetal weight of 6 pounds.  Laboratory work was within normal limits other than urine protein- creatinine ratio of 0.44 grams per gram.  The patient received oral Cytotec for cervical ripening, and had no change in her cervical exam over the course of 24 hours.  Upon my examination of the patient at 7:00 p.m. on hospital day #2, the patient's cervix changed to 1 cm, 80% effaced, and -2 station.  Pitocin titration had begun several hours earlier, and the patient was beginning to become uncomfortable.  I reexamined the patient 2 hours later, and her cervix was lower and slightly more dilated.  At that time, I performed artificial rupture of membranes with clear fluid resulting.  Fetal heart rate tracing throughout this entire time was category 1, baseline around 120 with moderate variability and accelerations present.  The patient's blood pressures had been normal to occasional mild range pressures.  After rupture of membranes, her blood pressures did elevate to 150s/100s.  She soon thereafter received an epidural without complication, and her blood pressures normalized.  Her labor progressed rapidly at this point, and she changed from 2 cm dilated to complete within 2 hours.  At that time she was allowed to labor down.  Total duration of first stage of labor was 1 hour 25 minutes.      After laboring down for approximately an hour, the patient was allowed to push.  Recurrent late and variable decelerations were noted with pushing which recovered with resting and  position change to the patient's right side.  Moderate variability was noted between the decelerations.  I was called to see the patient due to the fetal heart rate tracing, and at this time the patient's fetal station was +2.  We attempted more pushing in various positions, and ultimately discussed delivery by vacuum.  The patient consented to vacuum-assisted vaginal delivery due to recurrent late decelerations with pushing, and +2 fetal station.  The patient's bladder was emptied of approximately 50 cc of urine.  The NICU was called.      Consent was obtained.  After discussing the risk of cephalohematoma, subgaleal hemorrhage, epidural hemorrhage, intracranial hemorrhage, and maternal perineal laceration, the patient gave verbal consent to proceed.  Both the NICU and the OR were alerted that a vacuum extraction was planned.  After emptying the patient's bladder, a vacuum was applied over the sagittal suture, 3 cm anterior to posterior fontanelle.  Vacuum was applied x2 over 2 contractions with a total of 2 pulls and 1 audible release.  Total time the vacuum was applied was 210 seconds.  Maximum pressure was 450 mmHg.  Good descent of the fetal vertex was noted with each contraction.  After this, the fetal vertex was brought to a full crown and the vacuum was removed.  The remainder of the infant was delivered without complication.  A tight nuchal cord was noted, and this was released after delivery of the infant.  The infant was placed on the maternal abdomen and stimulated.  Tone was not excellent and, therefore, the cord was clamped x2 and cut, and the infant handed off to the waiting NICU staff.  Delivery was of a viable female infant at 2:01 a.m. on 12/16/2017.  Apgars were 5 at 1 minute, 7 at 5 minutes, and 9 at 10 minutes, respectively.  Weight was 5 pounds 14 ounces.      The placenta was delivered spontaneously intact with trailing membranes and a 3-vessel cord.  The fundus was firm with Pitocin and  bimanual massage.  The perineum was inspected, and a small midline laceration and vaginal laceration were repaired with 3-0 Vicryl under epidural anesthesia.  A left vaginal side wall laceration was reapproximated with a 4-0 Vicryl in interrupted fashion, and a right labial laceration was closed with a 4-0 Vicryl.  The patient tolerated the procedure well.  All sponge and needle counts were correct x2.  QBL for the procedure was 168 cc.         CHELLE CASEY MD             D: 2017 13:06   T: 2017 10:13   MT: [id number]      Name:     CHRISTA DOTY   MRN:      [id number]        Account:        [id number]   :      1985           Delivery Date:  2017      Document: [id number]       cc: [facility]

## 2017-12-17 NOTE — PLAN OF CARE
Problem: Patient Care Overview  Goal: Plan of Care/Patient Progress Review  Outcome: Improving  Tachy.  Other VSS.  Pain well controlled with ibuprofen.  Voiding. Up ad live.  Breastfeeding well, DEBRA nipples tender.  Using mother's love and hydrogel.  Will continue to monitor.

## 2017-12-17 NOTE — PLAN OF CARE
Problem: Patient Care Overview  Goal: Plan of Care/Patient Progress Review  Outcome: Improving  Doing well with self and infant cares, breast feeding independently. Using motherlove cream for mild nipple soreness. Ibuprofen for pain with stated relief, denies difficulty voiding. FOB present and supportive, involved in infant cares.

## 2017-12-17 NOTE — PLAN OF CARE
Problem: Patient Care Overview  Goal: Plan of Care/Patient Progress Review  Outcome: Improving  VSS. Postpartum assessment WDL - see flowsheet. Pt up ad live and independent with self cares. Supportive family at the bedside throughout shift and supportive with infant cares. Patient denies pain, taking ibuprofen - see MAR. Breastfeeding infant who has been sleepy at the breast at times this shift - encouraged pt to call for breastfeeding assistance. Educated mom on hand expression/breast pump use, who declined at this time but verbalized she will be interested starting pumping with next feeding. Continue to monitor.

## 2017-12-17 NOTE — PROGRESS NOTES
December 17, 2017  Postpartum Progress Note:       DAILY NOTE - POSTPARTUM DAY 1    SUBJECTIVE: doing well    Pain controlled? Yes  Tolerating a regular diet? YES  Ambulating? YES  Voiding without difficulty? Yes  Lochia? minimal  Breastfeeding:  no  Desired contraception? unsure    OBJECTIVE:  Vitals:    12/16/17 0740 12/16/17 1700 12/17/17 0234 12/17/17 0822   BP: 134/83 124/76 130/86 122/83   Pulse: 76 92 100 87   Resp: 18 18 16 18   Temp:  98.1  F (36.7  C) 97.9  F (36.6  C) 97.9  F (36.6  C)   TempSrc: Oral Oral Oral Oral   Weight:       Height:           Constitutional: Healthy, Alert, No distress  Abdomen:  Uterine fundus is firm, non-tender and at the level of the umbilicus   Extremeties:  tr edema, non-tender    LABS:  Hemoglobin   Date Value Ref Range Status   12/17/2017 12.8 11.7 - 15.7 g/dL Final   12/15/2017 12.6 11.7 - 15.7 g/dL Final     No results found for: RUBELLAABIGG   Lab Results   Component Value Date    ABO O 12/15/2017           Lab Results   Component Value Date    RH Pos 12/15/2017        ASSESSMENT:  Post-partum day #1  Normal spontaneous vaginal delivery  Induction for PIH; bp stable     PLAN:     Anticipate discharge tomorrow      Gayla Beckford

## 2017-12-18 VITALS
SYSTOLIC BLOOD PRESSURE: 131 MMHG | BODY MASS INDEX: 36.54 KG/M2 | DIASTOLIC BLOOD PRESSURE: 76 MMHG | HEART RATE: 79 BPM | HEIGHT: 64 IN | TEMPERATURE: 97.9 F | RESPIRATION RATE: 18 BRPM | WEIGHT: 214 LBS

## 2017-12-18 PROCEDURE — 40000084 ZZH STATISTIC IP LACTATION SERVICES 16-30 MIN

## 2017-12-18 PROCEDURE — 25000132 ZZH RX MED GY IP 250 OP 250 PS 637: Performed by: OBSTETRICS & GYNECOLOGY

## 2017-12-18 RX ADMIN — SERTRALINE HYDROCHLORIDE 50 MG: 50 TABLET ORAL at 08:53

## 2017-12-18 RX ADMIN — IBUPROFEN 800 MG: 800 TABLET, FILM COATED ORAL at 03:17

## 2017-12-18 RX ADMIN — CETIRIZINE HYDROCHLORIDE 10 MG: 10 TABLET, FILM COATED ORAL at 08:53

## 2017-12-18 RX ADMIN — SENNOSIDES AND DOCUSATE SODIUM 1 TABLET: 8.6; 5 TABLET ORAL at 08:53

## 2017-12-18 NOTE — PLAN OF CARE
Problem: Patient Care Overview  Goal: Plan of Care/Patient Progress Review  Outcome: Adequate for Discharge Date Met: 12/18/17  BP this am 131/76, denies PIH symptoms, up independently in room. Voiding without difficulty. Seen by MD may discharge to home today. RTC in 1-2 weeks for BP check Patient has no questions re BP monitoring and has BP machine at home and will call MD if has any concerns or develops any PIH symptoms.  All discharge instructions completed, no further questions. Verbalizes understanding of all follow up instructions for herself and baby. EPDS score 1. Ready to leave unit at 13.30.

## 2017-12-18 NOTE — PROGRESS NOTES
December 18, 2017  Postpartum Progress Note:       DAILY NOTE - POSTPARTUM DAY 2    SUBJECTIVE: feeling well; bp remains borderline    Pain controlled? Yes  Tolerating a regular diet? YES  Ambulating? YES  Voiding without difficulty? Yes  Lochia? minimal  Breastfeeding:  yes  Desired contraception? unsure    OBJECTIVE:  Vitals:    12/17/17 2139 12/18/17 0213 12/18/17 0308 12/18/17 0857   BP: 132/86 144/83 129/78 131/76   Pulse: 81 79 79    Resp: 18 18 18   Temp: 98  F (36.7  C) 97.9  F (36.6  C)  97.9  F (36.6  C)   TempSrc: Oral Oral  Oral   Weight:       Height:           Constitutional: Healthy, Alert, No distress  Abdomen:  Uterine fundus is firm, non-tender and at the level of the umbilicus   Extremeties:  +1 edema, non-tender    LABS:  Hemoglobin   Date Value Ref Range Status   12/17/2017 12.8 11.7 - 15.7 g/dL Final   12/15/2017 12.6 11.7 - 15.7 g/dL Final     No results found for: RUBELLAABIGG   Lab Results   Component Value Date    ABO O 12/15/2017           Lab Results   Component Value Date    RH Pos 12/15/2017        ASSESSMENT:  Post-partum day #2  Normal spontaneous vaginal delivery  Induction for gestational HTN     PLAN:   Discharge later today  Follow up for bp check in 1-2 wks    Gayla Beckford

## 2017-12-18 NOTE — LACTATION NOTE
JANINE to see patient.  Her baby has been latching frequently and swallows noted per patient.  She had many questions about pumping, foods and medications while nursing.  JANINE answered all questions.  She also has some bruising to her left nipple and small cracking.  Her right nipple appears to be intact, but sore.  She is using hydrogel.  No latch observed but Luba reports that her baby is now nursing well on the left side when using the football hold.  RPS technique was also reviewed, along with mastitis symptoms and engorgement.  She is aware she may call prn.

## 2017-12-18 NOTE — PLAN OF CARE
Problem: Hypertensive Disorders in Pregnancy (Adult,Obstetrics,Pediatric)  Goal: Signs and Symptoms of Listed Potential Problems Will be Absent, Minimized or Managed (Hypertensive Disorders in Pregnancy)  Signs and symptoms of listed potential problems will be absent, minimized or managed by discharge/transition of care (reference Hypertensive Disorders in Pregnancy (Adult,Obstetrics,Pediatric) CPG).   Outcome: Improving  VSS.  Up ad live.  Pain well controlled with ibuprofen.  Voiding.  Ambulating.  [place] breastfeeding.  Nipples painful, mother's love and hydrogel used. Began using electronic breast pump this shift.  EBM cup fed back to .   Plan to discharge today.

## 2017-12-18 NOTE — DISCHARGE INSTRUCTIONS
Postpartum Vaginal Delivery Instructions  BP check in clinic in 1-2 weeks. Post partum check in 6 weeks. [facility]     Activity       Ask family and friends for help when you need it.    Do not place anything in your vagina for 6 weeks.    You are not restricted on other activities, but take it easy for a few weeks to allow your body to recover from delivery.  You are able to do any activities you feel up to that point.    No driving until you have stopped taking your pain medications (usually two weeks after delivery).     Call your health care provider if you have any of these symptoms:       Increased pain, swelling, redness, or fluid around your stiches from an episiotomy or perineal tear.    A fever above 100.4 F (38 C) with or without chills when placing a thermometer under your tongue.    You soak a sanitary pad with blood within 1 hour, or you see blood clots larger than a golf ball.    Bleeding that lasts more than 6 weeks.    Vaginal discharge that smells bad.    Severe pain, cramping or tenderness in your lower belly area.    A need to urinate more frequently (use the toilet more often), more urgently (use the toilet very quickly), or it burns when you urinate.    Nausea and vomiting.    Redness, swelling or pain around a vein in your leg.    Problems breastfeeding or a red or painful area on your breast.    Chest pain and cough or are gasping for air.    Problems coping with sadness, anxiety, or depression.  If you have any concerns about hurting yourself or the baby, call your provider immediately.     You have questions or concerns after you return home.     Keep your hands clean:  Always wash your hands before touching your perineal area and stitches.  This helps reduce your risk of infection.  If your hands aren't dirty, you may use an alcohol hand-rub to clean your hands. Keep your nails clean and short.

## 2021-01-29 ENCOUNTER — HOSPITAL PATHOLOGY (OUTPATIENT)
Dept: OTHER | Facility: CLINIC | Age: 36
End: 2021-01-29

## 2021-02-02 LAB — COPATH REPORT: NORMAL

## 2023-03-30 ENCOUNTER — LAB REQUISITION (OUTPATIENT)
Dept: LAB | Facility: CLINIC | Age: 38
End: 2023-03-30
Payer: COMMERCIAL

## 2023-03-30 ENCOUNTER — TRANSFERRED RECORDS (OUTPATIENT)
Dept: HEALTH INFORMATION MANAGEMENT | Facility: CLINIC | Age: 38
End: 2023-03-30

## 2023-03-30 DIAGNOSIS — L65.9 NONSCARRING HAIR LOSS, UNSPECIFIED: ICD-10-CM

## 2023-03-30 DIAGNOSIS — Z83.49 FAMILY HISTORY OF OTHER ENDOCRINE, NUTRITIONAL AND METABOLIC DISEASES: ICD-10-CM

## 2023-03-30 LAB
BASOPHILS # BLD AUTO: 0.1 10E3/UL (ref 0–0.2)
BASOPHILS NFR BLD AUTO: 1 %
EOSINOPHIL # BLD AUTO: 0.1 10E3/UL (ref 0–0.7)
EOSINOPHIL NFR BLD AUTO: 1 %
ERYTHROCYTE [DISTWIDTH] IN BLOOD BY AUTOMATED COUNT: 13.2 % (ref 10–15)
FERRITIN SERPL-MCNC: 67 NG/ML (ref 6–175)
HCT VFR BLD AUTO: 44.4 % (ref 35–47)
HGB BLD-MCNC: 14.2 G/DL (ref 11.7–15.7)
HOLD SPECIMEN: NORMAL
IMM GRANULOCYTES # BLD: 0 10E3/UL
IMM GRANULOCYTES NFR BLD: 0 %
IRON BINDING CAPACITY (ROCHE): 376 UG/DL (ref 240–430)
IRON SATN MFR SERPL: 23 % (ref 15–46)
IRON SERPL-MCNC: 86 UG/DL (ref 37–145)
LYMPHOCYTES # BLD AUTO: 3.7 10E3/UL (ref 0.8–5.3)
LYMPHOCYTES NFR BLD AUTO: 45 %
MCH RBC QN AUTO: 28.5 PG (ref 26.5–33)
MCHC RBC AUTO-ENTMCNC: 32 G/DL (ref 31.5–36.5)
MCV RBC AUTO: 89 FL (ref 78–100)
MONOCYTES # BLD AUTO: 0.4 10E3/UL (ref 0–1.3)
MONOCYTES NFR BLD AUTO: 4 %
NEUTROPHILS # BLD AUTO: 4.1 10E3/UL (ref 1.6–8.3)
NEUTROPHILS NFR BLD AUTO: 49 %
NRBC # BLD AUTO: 0 10E3/UL
NRBC BLD AUTO-RTO: 0 /100
PLATELET # BLD AUTO: 294 10E3/UL (ref 150–450)
RBC # BLD AUTO: 4.98 10E6/UL (ref 3.8–5.2)
TRANSFERRIN SERPL-MCNC: 303 MG/DL (ref 200–360)
TSH SERPL DL<=0.005 MIU/L-ACNC: 2.11 UIU/ML (ref 0.3–4.2)
WBC # BLD AUTO: 8.4 10E3/UL (ref 4–11)

## 2023-03-30 PROCEDURE — 84443 ASSAY THYROID STIM HORMONE: CPT | Mod: ORL | Performed by: OBSTETRICS & GYNECOLOGY

## 2023-03-30 PROCEDURE — 82728 ASSAY OF FERRITIN: CPT | Mod: ORL | Performed by: OBSTETRICS & GYNECOLOGY

## 2023-03-30 PROCEDURE — 85025 COMPLETE CBC W/AUTO DIFF WBC: CPT | Mod: ORL | Performed by: OBSTETRICS & GYNECOLOGY

## 2023-03-30 PROCEDURE — 84466 ASSAY OF TRANSFERRIN: CPT | Mod: ORL | Performed by: OBSTETRICS & GYNECOLOGY

## 2023-03-30 PROCEDURE — 83550 IRON BINDING TEST: CPT | Mod: ORL | Performed by: OBSTETRICS & GYNECOLOGY

## 2023-03-30 PROCEDURE — 80053 COMPREHEN METABOLIC PANEL: CPT | Mod: ORL | Performed by: OBSTETRICS & GYNECOLOGY

## 2023-03-30 PROCEDURE — 86376 MICROSOMAL ANTIBODY EACH: CPT | Mod: ORL | Performed by: OBSTETRICS & GYNECOLOGY

## 2023-03-31 LAB
ALBUMIN SERPL BCG-MCNC: 4.2 G/DL (ref 3.5–5.2)
ALP SERPL-CCNC: 80 U/L (ref 35–104)
ALT SERPL W P-5'-P-CCNC: 10 U/L (ref 10–35)
ANION GAP SERPL CALCULATED.3IONS-SCNC: 14 MMOL/L (ref 7–15)
AST SERPL W P-5'-P-CCNC: 21 U/L (ref 10–35)
BILIRUB SERPL-MCNC: 0.2 MG/DL
BUN SERPL-MCNC: 7.9 MG/DL (ref 6–20)
CALCIUM SERPL-MCNC: 9.4 MG/DL (ref 8.6–10)
CHLORIDE SERPL-SCNC: 101 MMOL/L (ref 98–107)
CREAT SERPL-MCNC: 0.77 MG/DL (ref 0.51–0.95)
DEPRECATED HCO3 PLAS-SCNC: 23 MMOL/L (ref 22–29)
GFR SERPL CREATININE-BSD FRML MDRD: >90 ML/MIN/1.73M2
GLUCOSE SERPL-MCNC: 85 MG/DL (ref 70–99)
POTASSIUM SERPL-SCNC: 3.7 MMOL/L (ref 3.4–5.3)
PROT SERPL-MCNC: 7.3 G/DL (ref 6.4–8.3)
SODIUM SERPL-SCNC: 138 MMOL/L (ref 136–145)
THYROPEROXIDASE AB SERPL-ACNC: 197 IU/ML

## 2023-04-04 ENCOUNTER — MEDICAL CORRESPONDENCE (OUTPATIENT)
Dept: HEALTH INFORMATION MANAGEMENT | Facility: CLINIC | Age: 38
End: 2023-04-04
Payer: COMMERCIAL

## 2023-04-04 ENCOUNTER — TRANSCRIBE ORDERS (OUTPATIENT)
Dept: OTHER | Age: 38
End: 2023-04-04

## 2023-04-04 DIAGNOSIS — E06.3 HASHIMOTO'S THYROIDITIS: Primary | ICD-10-CM

## 2023-05-08 ENCOUNTER — OFFICE VISIT (OUTPATIENT)
Dept: ENDOCRINOLOGY | Facility: CLINIC | Age: 38
End: 2023-05-08
Payer: COMMERCIAL

## 2023-05-08 VITALS
WEIGHT: 182.3 LBS | SYSTOLIC BLOOD PRESSURE: 118 MMHG | RESPIRATION RATE: 16 BRPM | BODY MASS INDEX: 31.12 KG/M2 | DIASTOLIC BLOOD PRESSURE: 76 MMHG | OXYGEN SATURATION: 97 % | HEIGHT: 64 IN | TEMPERATURE: 98.7 F | HEART RATE: 113 BPM

## 2023-05-08 DIAGNOSIS — E06.3 HASHIMOTO'S THYROIDITIS: ICD-10-CM

## 2023-05-08 DIAGNOSIS — R53.83 OTHER FATIGUE: Primary | ICD-10-CM

## 2023-05-08 PROCEDURE — 36415 COLL VENOUS BLD VENIPUNCTURE: CPT | Performed by: PHYSICIAN ASSISTANT

## 2023-05-08 PROCEDURE — 84432 ASSAY OF THYROGLOBULIN: CPT | Performed by: PHYSICIAN ASSISTANT

## 2023-05-08 PROCEDURE — 99203 OFFICE O/P NEW LOW 30 MIN: CPT | Performed by: PHYSICIAN ASSISTANT

## 2023-05-08 PROCEDURE — 86800 THYROGLOBULIN ANTIBODY: CPT | Performed by: PHYSICIAN ASSISTANT

## 2023-05-08 PROCEDURE — 99000 SPECIMEN HANDLING OFFICE-LAB: CPT | Performed by: PHYSICIAN ASSISTANT

## 2023-05-08 PROCEDURE — 84443 ASSAY THYROID STIM HORMONE: CPT | Performed by: PHYSICIAN ASSISTANT

## 2023-05-08 PROCEDURE — 84432 ASSAY OF THYROGLOBULIN: CPT | Mod: 90 | Performed by: PHYSICIAN ASSISTANT

## 2023-05-08 PROCEDURE — 84439 ASSAY OF FREE THYROXINE: CPT | Performed by: PHYSICIAN ASSISTANT

## 2023-05-08 RX ORDER — BIOTIN 5 MG
TABLET ORAL
COMMUNITY

## 2023-05-08 RX ORDER — VENLAFAXINE HYDROCHLORIDE 150 MG/1
150 CAPSULE, EXTENDED RELEASE ORAL DAILY
COMMUNITY
Start: 2023-03-22

## 2023-05-08 RX ORDER — VENLAFAXINE 75 MG/1
TABLET ORAL
COMMUNITY
Start: 2023-03-30 | End: 2023-08-07 | Stop reason: DRUGHIGH

## 2023-05-08 RX ORDER — LISDEXAMFETAMINE DIMESYLATE 70 MG/1
CAPSULE ORAL
COMMUNITY
Start: 2023-04-05

## 2023-05-08 RX ORDER — VITAMIN E 268 MG
CAPSULE ORAL DAILY
COMMUNITY

## 2023-05-08 RX ORDER — DIAZEPAM 5 MG
TABLET ORAL
COMMUNITY
Start: 2023-02-20

## 2023-05-08 RX ORDER — ETONOGESTREL/ETHINYL ESTRADIOL .12-.015MG
RING, VAGINAL VAGINAL
COMMUNITY
Start: 2023-04-24

## 2023-05-08 RX ORDER — FERROUS SULFATE 325(65) MG
325 TABLET ORAL
COMMUNITY
End: 2023-08-07

## 2023-05-08 RX ORDER — PROPRANOLOL HYDROCHLORIDE 10 MG/1
TABLET ORAL
COMMUNITY
Start: 2022-06-15

## 2023-05-08 RX ORDER — RIZATRIPTAN BENZOATE 10 MG/1
TABLET ORAL
COMMUNITY
Start: 2023-04-03

## 2023-05-08 NOTE — PROGRESS NOTES
Assessment/Plan :   1. Hashimoto's Thyroiditis. We discussed her previous laboratory results. We also reviewed the signs and symptoms of low and excess thyroid hormone. Her TSH was within normal limits and I am worried about the possibility of causing hyperthyroidism with the start of thyroid hormone. I want to repeat a TSH today along with a thyroid hormone level. I will contact her with the results. We will plan on a follow-up appt in 3 mos but a repeat lab testing in 4-6 wks.    If she has any further questions, she can contact me through [employer].      I have independently reviewed and interpreted labs, imaging as indicated.      Chief complaint:  Luba is a 37 year old female seen in consultation at the request of Dr. Callaway for Hashimoto's Thyroiditis.    I have reviewed Care Everywhere including [facility], [facility], [facility], [facility] , [facility], [place] lab reports, imaging reports and provider notes as indicated.      HISTORY OF PRESENT ILLNESS  About a month ago, Luba dyed her hair a darker shade of brown. She then noticed that her hair was falling out in clumps. It didn't seem to be related to the dying process and she started to research other causes of hair loss. She has also had problems with anxiousness, irritability, dry skin, and malaise. These other symptoms seemed to start around the time she had her child, about 5 yrs ago, and they have continued to worsen over the years. Everything that she found in her research pointed to Hashimoto's Thyroiditis. She also has a sister with Hashimoto's Thyroiditis. She scheduled an appt with her primary care provider and she was told that she was positive for Hashimoto's.    She has never taken thyroid medication. She has a history of salivary gland inflammation but she never underwent any type of radiation. She does have a history of depression and anxiety but she thinks that it may be connected to the thyroid  disorder. She would like to get off of the antidepressants and ADHD medication, if possible.    Endocrine relevant labs are as follows:   Latest Reference Range & Units 03/30/23 14:21   TSH 0.30 - 4.20 uIU/mL 2.11      Latest Reference Range & Units 03/30/23 14:22   Thyroid Peroxidase Antibody <35 IU/mL 197 (H)   (H): Data is abnormally high    REVIEW OF SYSTEMS    Endocrine: positive for thyroid disorder  Skin: negative  Eyes: negative for, visual blurring  Ears/Nose/Throat: positive for dysphagia and pain, negative for, hoarseness  Respiratory: No shortness of breath, dyspnea on exertion, cough, or hemoptysis  Cardiovascular: negative for, chest pain, lower extremity edema and exercise intolerance  Gastrointestinal: negative for, nausea, vomiting, constipation and diarrhea  Genitourinary: negative for, nocturia, dysuria, frequency and urgency  Musculoskeletal: negative for, muscular weakness and nocturnal cramping  Neurologic: positive for migraine headaches, negative for, numbness or tingling of hands and numbness or tingling of feet  Psychiatric: positive for feeling anxious and depression  Hematologic/Lymphatic/Immunologic: negative    Past Medical History  Past Medical History:   Diagnosis Date     Abnormal Pap smear of cervix 2011    MODERATE DYSPLASIA OF CERVIX     Asthma      Blood type O+ 5/31/2017     Herpes 2011     Major depressive disorder     depression on Meds     Migraine      Varicella without mention of complication     MUMPS ON 2007       Medications  Current Outpatient Medications   Medication Sig Dispense Refill     albuterol 90 MCG/ACT inhaler Inhale 2 puffs into the lungs every 4 hours as needed.       Biotin 5 MG TABS        cetirizine (ZYRTEC) 10 MG tablet Take 1 tablet (10 mg) by mouth every evening 30 tablet 1     diazepam (VALIUM) 5 MG tablet TAKE 1 TABLET BY ORAL ROUTE 1 TIMES PER DAY AS NEEDED FOR EXTREME ANXIETY       Elderberry 500 MG CAPS        ferrous sulfate (FEROSUL) 325 (65 Fe)  "MG tablet Take 1 tablet (325 mg) by mouth daily (with breakfast)       NUVARING 0.12-0.015 MG/24HR vaginal ring        propranolol (INDERAL) 10 MG tablet TAKE 1 TABLET BY ORAL ROUTE 3 TIMES PER DAY AS NEEDED FOR ANXIETY AND PANIC       rizatriptan (MAXALT) 10 MG tablet TAKE 1 TABLET BY MOUTH ONCE AS NEEDED FOR HEADACHE FOR UP TO 1 DOSE.       venlafaxine (EFFEXOR XR) 150 MG 24 hr capsule Take 150 mg by mouth daily       venlafaxine (EFFEXOR) 75 MG tablet TAKE 1 TABLET BY MOUTH EVERY DAY (WITH A 150 MG = 225 MG)       vitamin E (TOCOPHEROL) 400 units (180 mg) capsule Take by mouth daily       VYVANSE 70 MG capsule take 1 capsule by mouth every morning         Allergies  Allergies   Allergen Reactions     Bee Venom      Cephalosporins Swelling         Family History  family history includes Diabetes in her maternal grandmother and mother; Lung Cancer in her maternal grandfather; Thyroid Disease in her father and sister.    Social History  Social History     Tobacco Use     Smoking status: Never     Smokeless tobacco: Never   Substance Use Topics     Alcohol use: No     Comment: 1 DRINK PER week when not pregnant     Drug use: No       Physical Exam  /76 (BP Location: Left arm, Patient Position: Chair, Cuff Size: Adult Regular)   Pulse 113   Temp 98.7  F (37.1  C) (Tympanic)   Resp 16   Ht 1.626 m (5' 4.02\")   Wt 82.7 kg (182 lb 4.8 oz)   LMP  (LMP Unknown)   SpO2 97%   Breastfeeding No   BMI 31.28 kg/m    Body mass index is 31.28 kg/m .  GENERAL :  In no apparent distress  SKIN: Normal color, normal temperature, texture.  No hirsutism, alopecia or purple striae.     EYES: PERRL, EOMI, No scleral icterus,  No proptosis, conjunctival redness, stare, retraction  NECK: No visible masses. No palpable adenopathy, or masses. No carotid bruits.   THYROID:  Normal, nontender, smooth / firm texture,  no nodules, no Bruit   RESP: Lungs clear to auscultation bilaterally  CARDIAC: Regular rate and rhythm, normal S1 " S2, without murmurs, rubs or gallops    NEURO: awake, alert, responds appropriately to questions.  Cranial nerves intact.   Moves all extremities; Gait normal.  No tremor of the outstretched hand.    EXTREMITIES: No clubbing, cyanosis or edema.

## 2023-05-08 NOTE — LETTER
5/8/2023         RE: Luba Vargas  [address]  [facility] [id number]        Dear Colleague,    Thank you for referring your patient, Luba Vargas, to the [facility]. Please see a copy of my visit note below.    Assessment/Plan :   1. Hashimoto's Thyroiditis. We discussed her previous laboratory results. We also reviewed the signs and symptoms of low and excess thyroid hormone. Her TSH was within normal limits and I am worried about the possibility of causing hyperthyroidism with the start of thyroid hormone. I want to repeat a TSH today along with a thyroid hormone level. I will contact her with the results. We will plan on a follow-up appt in 3 mos but a repeat lab testing in 4-6 wks.    If she has any further questions, she can contact me through [employer].      I have independently reviewed and interpreted labs, imaging as indicated.      Chief complaint:  Luba is a 37 year old female seen in consultation at the request of Dr. Callaway for Hashimoto's Thyroiditis.    I have reviewed Care Everywhere including [facility], [facility], [facility], [facility], [facility] , [facility], [place] lab reports, imaging reports and provider notes as indicated.      HISTORY OF PRESENT ILLNESS  About a month ago, Luba dyed her hair a darker shade of brown. She then noticed that her hair was falling out in clumps. It didn't seem to be related to the dying process and she started to research other causes of hair loss. She has also had problems with anxiousness, irritability, dry skin, and malaise. These other symptoms seemed to start around the time she had her child, about 5 yrs ago, and they have continued to worsen over the years. Everything that she found in her research pointed to Hashimoto's Thyroiditis. She also has a sister with Hashimoto's Thyroiditis. She scheduled an appt with her primary care provider and she was told that she was positive  for Hashimoto's.    She has never taken thyroid medication. She has a history of salivary gland inflammation but she never underwent any type of radiation. She does have a history of depression and anxiety but she thinks that it may be connected to the thyroid disorder. She would like to get off of the antidepressants and ADHD medication, if possible.    Endocrine relevant labs are as follows:   Latest Reference Range & Units 03/30/23 14:21   TSH 0.30 - 4.20 uIU/mL 2.11      Latest Reference Range & Units 03/30/23 14:22   Thyroid Peroxidase Antibody <35 IU/mL 197 (H)   (H): Data is abnormally high    REVIEW OF SYSTEMS    Endocrine: positive for thyroid disorder  Skin: negative  Eyes: negative for, visual blurring  Ears/Nose/Throat: positive for dysphagia and pain, negative for, hoarseness  Respiratory: No shortness of breath, dyspnea on exertion, cough, or hemoptysis  Cardiovascular: negative for, chest pain, lower extremity edema and exercise intolerance  Gastrointestinal: negative for, nausea, vomiting, constipation and diarrhea  Genitourinary: negative for, nocturia, dysuria, frequency and urgency  Musculoskeletal: negative for, muscular weakness and nocturnal cramping  Neurologic: positive for migraine headaches, negative for, numbness or tingling of hands and numbness or tingling of feet  Psychiatric: positive for feeling anxious and depression  Hematologic/Lymphatic/Immunologic: negative    Past Medical History  Past Medical History:   Diagnosis Date     Abnormal Pap smear of cervix 2011    MODERATE DYSPLASIA OF CERVIX     Asthma      Blood type O+ 5/31/2017     Herpes 2011     Major depressive disorder     depression on Meds     Migraine      Varicella without mention of complication     MUMPS ON 2007       Medications  Current Outpatient Medications   Medication Sig Dispense Refill     albuterol 90 MCG/ACT inhaler Inhale 2 puffs into the lungs every 4 hours as needed.       Biotin 5 MG TABS        cetirizine  "(ZYRTEC) 10 MG tablet Take 1 tablet (10 mg) by mouth every evening 30 tablet 1     diazepam (VALIUM) 5 MG tablet TAKE 1 TABLET BY ORAL ROUTE 1 TIMES PER DAY AS NEEDED FOR EXTREME ANXIETY       Elderberry 500 MG CAPS        ferrous sulfate (FEROSUL) 325 (65 Fe) MG tablet Take 1 tablet (325 mg) by mouth daily (with breakfast)       NUVARING 0.12-0.015 MG/24HR vaginal ring        propranolol (INDERAL) 10 MG tablet TAKE 1 TABLET BY ORAL ROUTE 3 TIMES PER DAY AS NEEDED FOR ANXIETY AND PANIC       rizatriptan (MAXALT) 10 MG tablet TAKE 1 TABLET BY MOUTH ONCE AS NEEDED FOR HEADACHE FOR UP TO 1 DOSE.       venlafaxine (EFFEXOR XR) 150 MG 24 hr capsule Take 150 mg by mouth daily       venlafaxine (EFFEXOR) 75 MG tablet TAKE 1 TABLET BY MOUTH EVERY DAY (WITH A 150 MG = 225 MG)       vitamin E (TOCOPHEROL) 400 units (180 mg) capsule Take by mouth daily       VYVANSE 70 MG capsule take 1 capsule by mouth every morning         Allergies  Allergies   Allergen Reactions     Bee Venom      Cephalosporins Swelling         Family History  family history includes Diabetes in her maternal grandmother and mother; Lung Cancer in her maternal grandfather; Thyroid Disease in her father and sister.    Social History  Social History     Tobacco Use     Smoking status: Never     Smokeless tobacco: Never   Substance Use Topics     Alcohol use: No     Comment: 1 DRINK PER week when not pregnant     Drug use: No       Physical Exam  /76 (BP Location: Left arm, Patient Position: Chair, Cuff Size: Adult Regular)   Pulse 113   Temp 98.7  F (37.1  C) (Tympanic)   Resp 16   Ht 1.626 m (5' 4.02\")   Wt 82.7 kg (182 lb 4.8 oz)   LMP  (LMP Unknown)   SpO2 97%   Breastfeeding No   BMI 31.28 kg/m    Body mass index is 31.28 kg/m .  GENERAL :  In no apparent distress  SKIN: Normal color, normal temperature, texture.  No hirsutism, alopecia or purple striae.     EYES: PERRL, EOMI, No scleral icterus,  No proptosis, conjunctival redness, stare, " retraction  NECK: No visible masses. No palpable adenopathy, or masses. No carotid bruits.   THYROID:  Normal, nontender, smooth / firm texture,  no nodules, no Bruit   RESP: Lungs clear to auscultation bilaterally  CARDIAC: Regular rate and rhythm, normal S1 S2, without murmurs, rubs or gallops    NEURO: awake, alert, responds appropriately to questions.  Cranial nerves intact.   Moves all extremities; Gait normal.  No tremor of the outstretched hand.    EXTREMITIES: No clubbing, cyanosis or edema.                Again, thank you for allowing me to participate in the care of your patient.        Sincerely,        Patria Fields PA-C

## 2023-05-09 ENCOUNTER — TELEPHONE (OUTPATIENT)
Dept: ENDOCRINOLOGY | Facility: CLINIC | Age: 38
End: 2023-05-09
Payer: COMMERCIAL

## 2023-05-09 DIAGNOSIS — E06.3 HASHIMOTO'S THYROIDITIS: Primary | ICD-10-CM

## 2023-05-09 LAB
T4 FREE SERPL-MCNC: 1.01 NG/DL (ref 0.9–1.7)
THYROGLOB AB SERPL IA-ACNC: 61 IU/ML
TSH SERPL DL<=0.005 MIU/L-ACNC: 1.75 UIU/ML (ref 0.3–4.2)

## 2023-05-09 NOTE — TELEPHONE ENCOUNTER
Pt called and notified that Patria Fields has ordered a thyroid US to be completed prior to starting medication. Pt will call to schedule.

## 2023-05-09 NOTE — TELEPHONE ENCOUNTER
[facility] Call Center    Phone Message    May a detailed message be left on voicemail: yes      Reason for Call: Other: Completed  New Patient appointment on 5/8/23 where they dicussed starting medications for thyroid , per patient was to expect prescription or call today discuss. She is following up for update.      Thank you     Action Taken: Other: ENDO    Travel Screening: Not Applicable

## 2023-05-09 NOTE — TELEPHONE ENCOUNTER
Per 5/8/23 OV:    Her TSH was within normal limits and I am worried about the possibility of causing hyperthyroidism with the start of thyroid hormone. I want to repeat a TSH today along with a thyroid hormone level. I will contact her with the results. We will plan on a follow-up appt in 3 mos but a repeat lab testing in 4-6 wks.     If she has any further questions, she can contact me through [email or web address].

## 2023-05-10 ENCOUNTER — HOSPITAL ENCOUNTER (OUTPATIENT)
Dept: ULTRASOUND IMAGING | Facility: CLINIC | Age: 38
Discharge: HOME | End: 2023-05-10
Attending: PHYSICIAN ASSISTANT | Admitting: PHYSICIAN ASSISTANT
Payer: COMMERCIAL

## 2023-05-10 DIAGNOSIS — E06.3 HASHIMOTO'S THYROIDITIS: ICD-10-CM

## 2023-05-10 PROCEDURE — 76536 US EXAM OF HEAD AND NECK: CPT

## 2023-05-11 ENCOUNTER — MYC MEDICAL ADVICE (OUTPATIENT)
Dept: ENDOCRINOLOGY | Facility: CLINIC | Age: 38
End: 2023-05-11
Payer: COMMERCIAL

## 2023-05-11 DIAGNOSIS — E06.3 HASHIMOTO'S THYROIDITIS: Primary | ICD-10-CM

## 2023-05-12 RX ORDER — LEVOTHYROXINE SODIUM 25 UG/1
25 TABLET ORAL DAILY
Qty: 30 TABLET | Refills: 1 | Status: SHIPPED | OUTPATIENT
Start: 2023-05-12 | End: 2023-06-14

## 2023-05-13 LAB — THYROGLOB SERPL-MCNC: 2 NG/ML

## 2023-06-02 ENCOUNTER — HEALTH MAINTENANCE LETTER (OUTPATIENT)
Age: 38
End: 2023-06-02

## 2023-06-03 DIAGNOSIS — E06.3 HASHIMOTO'S THYROIDITIS: ICD-10-CM

## 2023-06-08 DIAGNOSIS — R63.5 WEIGHT GAIN: ICD-10-CM

## 2023-06-08 DIAGNOSIS — E06.3 HASHIMOTO'S THYROIDITIS: Primary | ICD-10-CM

## 2023-06-08 DIAGNOSIS — R53.83 OTHER FATIGUE: ICD-10-CM

## 2023-06-12 ENCOUNTER — DOCUMENTATION ONLY (OUTPATIENT)
Dept: LAB | Facility: CLINIC | Age: 38
End: 2023-06-12
Payer: COMMERCIAL

## 2023-06-12 DIAGNOSIS — E06.3 HASHIMOTO'S THYROIDITIS: Primary | ICD-10-CM

## 2023-06-12 NOTE — TELEPHONE ENCOUNTER
Pt needs labs now    Per notes 5/8:  We will plan on a follow-up appt in 3 mos but a repeat lab testing in 4-6 wks.

## 2023-06-12 NOTE — TELEPHONE ENCOUNTER
[facility] Call Center    Phone Message    May a detailed message be left on voicemail: yes     Reason for Call: Medication Refill Request    Has the patient contacted the pharmacy for the refill? Yes     Name of medication being requested:   * levothyroxine (SYNTHROID/LEVOTHROID) 25 MCG tablet    Provider who prescribed the medication: Donnie    Pharmacy: [id number] [place], [place] - [address]    Date medication is needed: 6/12/2023     Patient is out of medication as of yesterday 6/11/2023      Action Taken: Message routed to:  [facility] Endo    Travel Screening: Not Applicable

## 2023-06-12 NOTE — PROGRESS NOTES
Luba Vargas has an upcoming lab appointment:    Future Appointments   Date Time Provider Department Center   6/13/2023  2:15 PM [facility]   8/7/2023 10:30 AM Patria Fields PA-C [facility]   9/20/2023  9:00 AM Yulissa Sofia PA-C [facility] [place] [facility]   9/20/2023  1:00 PM 3, Sh [address] Alejandra, RD [facility] [place] [facility]     There is no order available. Please review and place either future orders or HMPO (Review of Health Maintenance Protocol Orders), as appropriate.    Health Maintenance Due   Topic     ANNUAL REVIEW OF HM ORDERS      HEPATITIS C SCREENING      Nisha Lewis

## 2023-06-13 ENCOUNTER — LAB (OUTPATIENT)
Dept: LAB | Facility: CLINIC | Age: 38
End: 2023-06-13
Payer: COMMERCIAL

## 2023-06-13 DIAGNOSIS — E06.3 HASHIMOTO'S THYROIDITIS: ICD-10-CM

## 2023-06-13 LAB
ANION GAP SERPL CALCULATED.3IONS-SCNC: 14 MMOL/L (ref 7–15)
BUN SERPL-MCNC: 9.4 MG/DL (ref 6–20)
CALCIUM SERPL-MCNC: 9.2 MG/DL (ref 8.6–10)
CHLORIDE SERPL-SCNC: 107 MMOL/L (ref 98–107)
CREAT SERPL-MCNC: 0.79 MG/DL (ref 0.51–0.95)
DEPRECATED HCO3 PLAS-SCNC: 24 MMOL/L (ref 22–29)
GFR SERPL CREATININE-BSD FRML MDRD: >90 ML/MIN/1.73M2
GLUCOSE SERPL-MCNC: 103 MG/DL (ref 70–99)
POTASSIUM SERPL-SCNC: 3.9 MMOL/L (ref 3.4–5.3)
SODIUM SERPL-SCNC: 145 MMOL/L (ref 136–145)
TSH SERPL DL<=0.005 MIU/L-ACNC: 1.25 UIU/ML (ref 0.3–4.2)

## 2023-06-13 PROCEDURE — 84443 ASSAY THYROID STIM HORMONE: CPT

## 2023-06-13 PROCEDURE — 80048 BASIC METABOLIC PNL TOTAL CA: CPT

## 2023-06-13 PROCEDURE — 36415 COLL VENOUS BLD VENIPUNCTURE: CPT

## 2023-06-14 RX ORDER — LEVOTHYROXINE SODIUM 25 UG/1
TABLET ORAL
Qty: 30 TABLET | Refills: 1 | Status: SHIPPED | OUTPATIENT
Start: 2023-06-14 | End: 2023-08-17

## 2023-07-07 DIAGNOSIS — E06.3 HASHIMOTO'S THYROIDITIS: ICD-10-CM

## 2023-07-24 ENCOUNTER — TELEPHONE (OUTPATIENT)
Dept: SURGERY | Facility: CLINIC | Age: 38
End: 2023-07-24
Payer: COMMERCIAL

## 2023-07-24 ASSESSMENT — ENCOUNTER SYMPTOMS
INSOMNIA: 1
NAUSEA: 1
HEARTBURN: 1
SINUS PAIN: 0
HOARSE VOICE: 0
DIARRHEA: 1
SWOLLEN GLANDS: 1
DEPRESSION: 1
JAUNDICE: 0
NAIL CHANGES: 0
BLOOD IN STOOL: 0
SKIN CHANGES: 0
NERVOUS/ANXIOUS: 1
TROUBLE SWALLOWING: 0
ABDOMINAL PAIN: 0
BLOATING: 1
SMELL DISTURBANCE: 0
CONSTIPATION: 1
POOR WOUND HEALING: 0
RECTAL PAIN: 0
PANIC: 0
DECREASED CONCENTRATION: 1
VOMITING: 0
BRUISES/BLEEDS EASILY: 0
BOWEL INCONTINENCE: 0
NECK MASS: 0
SORE THROAT: 0
TASTE DISTURBANCE: 0
SINUS CONGESTION: 1

## 2023-07-24 ASSESSMENT — SLEEP AND FATIGUE QUESTIONNAIRES
HOW LIKELY ARE YOU TO NOD OFF OR FALL ASLEEP WHILE LYING DOWN TO REST IN THE AFTERNOON WHEN CIRCUMSTANCES PERMIT: HIGH CHANCE OF DOZING
HOW LIKELY ARE YOU TO NOD OFF OR FALL ASLEEP WHILE WATCHING TV: HIGH CHANCE OF DOZING
HOW LIKELY ARE YOU TO NOD OFF OR FALL ASLEEP WHILE SITTING INACTIVE IN A PUBLIC PLACE: WOULD NEVER DOZE
HOW LIKELY ARE YOU TO NOD OFF OR FALL ASLEEP WHEN YOU ARE A PASSENGER IN A CAR FOR AN HOUR WITHOUT A BREAK: MODERATE CHANCE OF DOZING
HOW LIKELY ARE YOU TO NOD OFF OR FALL ASLEEP IN A CAR, WHILE STOPPED FOR A FEW MINUTES IN TRAFFIC: WOULD NEVER DOZE
HOW LIKELY ARE YOU TO NOD OFF OR FALL ASLEEP WHILE SITTING AND READING: HIGH CHANCE OF DOZING
HOW LIKELY ARE YOU TO NOD OFF OR FALL ASLEEP WHILE SITTING QUIETLY AFTER LUNCH WITHOUT ALCOHOL: HIGH CHANCE OF DOZING
HOW LIKELY ARE YOU TO NOD OFF OR FALL ASLEEP WHILE SITTING AND TALKING TO SOMEONE: WOULD NEVER DOZE

## 2023-07-25 ENCOUNTER — OFFICE VISIT (OUTPATIENT)
Dept: SURGERY | Facility: CLINIC | Age: 38
End: 2023-07-25
Attending: PHYSICIAN ASSISTANT
Payer: COMMERCIAL

## 2023-07-25 VITALS
WEIGHT: 179 LBS | SYSTOLIC BLOOD PRESSURE: 122 MMHG | BODY MASS INDEX: 30.56 KG/M2 | OXYGEN SATURATION: 97 % | DIASTOLIC BLOOD PRESSURE: 79 MMHG | HEIGHT: 64 IN | HEART RATE: 90 BPM

## 2023-07-25 DIAGNOSIS — R63.5 WEIGHT GAIN: ICD-10-CM

## 2023-07-25 DIAGNOSIS — Z13.21 SCREENING FOR ENDOCRINE, NUTRITIONAL, METABOLIC AND IMMUNITY DISORDER: ICD-10-CM

## 2023-07-25 DIAGNOSIS — Z13.228 SCREENING FOR ENDOCRINE, NUTRITIONAL, METABOLIC AND IMMUNITY DISORDER: ICD-10-CM

## 2023-07-25 DIAGNOSIS — Z13.0 SCREENING FOR ENDOCRINE, NUTRITIONAL, METABOLIC AND IMMUNITY DISORDER: ICD-10-CM

## 2023-07-25 DIAGNOSIS — E66.811 CLASS 1 OBESITY DUE TO EXCESS CALORIES WITH SERIOUS COMORBIDITY AND BODY MASS INDEX (BMI) OF 30.0 TO 30.9 IN ADULT: Primary | ICD-10-CM

## 2023-07-25 DIAGNOSIS — Z13.29 SCREENING FOR ENDOCRINE, NUTRITIONAL, METABOLIC AND IMMUNITY DISORDER: ICD-10-CM

## 2023-07-25 DIAGNOSIS — G47.9 SLEEP DISTURBANCE: ICD-10-CM

## 2023-07-25 DIAGNOSIS — E66.09 CLASS 1 OBESITY DUE TO EXCESS CALORIES WITH SERIOUS COMORBIDITY AND BODY MASS INDEX (BMI) OF 30.0 TO 30.9 IN ADULT: Primary | ICD-10-CM

## 2023-07-25 PROCEDURE — 99204 OFFICE O/P NEW MOD 45 MIN: CPT | Performed by: PHYSICIAN ASSISTANT

## 2023-07-25 NOTE — PATIENT INSTRUCTIONS
"Nice to talk with you today! Thank you for allowing me the privilege of caring for you.   We hope we provided you with the excellent service you deserve.     To ensure the quality of our services you may receive a patient satisfaction survey from an independent monitoring company.  The greatest compliment you can give is \"Likely to Recommend\"      Below is our plan we discussed.-  HONG Duenas        PATIENT INSTRUCTIONS:  Get labs drawn  See dietitian  Increase water to 64 oz daily   Meet with sleep center  Will hold off on exercise goal until meets with sleep center      Please schedule a follow up with Yulissa Sofia PA-C in 3 months.  If you need to reach me sooner you can do so by calling [phone].    Have a great day!         SAXENDA (liraglutide)    Saxenda is a GLP-1 (Glucagon-like Peptide-1) medication.One of the ways it works is by slowing down the rate that food leaves your stomach. You feel chung and will eat less. It also helps regulate hormones that can help improve your blood sugars.    Dosing for this medication:   Week 1- Inject 0.6 mg daily  Week 2- Inject 1.2 mg daily  Week 3- Inject 1.8 mg daily (If hunger and portions controlled stay at this dose)  Week 4- Inject 2.4 mg daily  Week 5 and thereafter- Inject 3.0 mg daily    Side effects of GLP- Medications include: The most common side effects are all GI related and consist of: nausea, heartburn, constipation, diarrhea, burping, or gassiness. Patients are advised to eat slowly and less, and nausea typically passes if people can stick it out.     The risk of pancreatitis (inflammation of the pancreas) has been associated with this type of medication, but is very rare.  If you have had pancreatitis in the past, this medication may not be for you. Please let us know about any past history of pancreas problems.  Symptoms of pancreatitis include: Pain in your upper stomach area which may travel to your back and be worse after eating. Your stomach " area may be tender to the touch.  You may have vomiting or nausea and/or have a fever. If you should develop any of these symptoms, stop the medication and contact your primary care doctor. They will do a blood test to check for pancreatitis.       This medication is usually not covered by insurance and can be quite expensive. Sometimes a prior authorization is required, which may take up to 1-2 weeks for an insurance company to make a decision if they will cover the medication. Please be patient, you will be notified after a decision has been made.     (Do not stop taking it if you don't think it's working. For some people it works without them knowing it.)     In order to get refills of this or any medication we prescribe you must be seen in the medical weight mgmt clinic every 2-4 months.        MEDICATION STARTED AT THIS APPOINTMENT    We are starting metformin.  Starting instructions:    Extended release tablet: Take 1 tablet by mouth daily with a meal for 1 week, then increase to 2 tablets daily with a meal or 1 tablet twice daily with meals.      Call the nurse at [phone] if you have any questions or concerns.     Metformin is a medication that is used to assist with lowering blood sugars in patients that have Pre-Diabetes or Diabetes. It has also been found to help with weight loss.    Metformin helps to lower blood sugars by lowering the amount of sugar (glucose) your liver produces that would otherwise cause your blood sugar to increase. It also makes your body respond better to insulin (the product that lowers your blood sugar). It is unclear how metformin assists with weight loss.     Side-effects. Metformin is generally well tolerated. The main side-effects we see are diarrhea, nausea and stomach upset - this tends to subside over time as your body gets used to the medication. Taking this medications with food will lower these side effects.    Low blood sugar (hypoglycemia) is rare to occur with  metformin, but can occur if you do not eat enough or are taking other medications that assist to lower blood sugar. The signs of low blood sugar are:  Weakness  Shaky   Hungry  Sweating  Confusion      See below for ways to treat low blood sugar without adding in lots of extra calories.    Treating Low Blood Sugar    If you have symptoms of low blood sugar (sweating, shaking, dizzy, confused) eat 15 grams of carbs and wait 15 minutes:    Glucose Tabs are best for sugars under 70 -  Dex4 or BD Glucose tablets are good, you will need to take 3-4 of these to equal 15 grams.     One small box of raisins  4 oz fruit juice box or   cup fruit juice  1 small apple  1 small banana    cup canned fruit in water    English muffin or a slice of bread with jelly   1 low fat frozen waffle with sugar-free syrup    cup cottage cheese with   cup frozen or fresh blueberries  1 cup skim or low-fat milk    cup whole grain cereal  4-6 crackers such as Triscuits    Please refer to the pharmacy insert for more information on side-effects. Please call the clinic should you have more questions regarding this medication.      In order to get refills of this or any medication we prescribe you must be seen in the medical weight mgmt clinic every 2-3 months.

## 2023-07-25 NOTE — PROGRESS NOTES
"New Medical Weight Management Consult      PATIENT:  Luba Vargas  MRN:         [id number]  :         1985  JOS:         2023        Dear Heide Blackman MD,    I had the pleasure of seeing your patient, Luba Vargas. Full intake/assessment was done to determine barriers to weight loss success and develop a treatment plan. Luba Vargas is a 38 year old female interested in treatment of medical problems associated with excess weight. She has a height of 5' 4\", a weight of 179 lbs 0 oz, and the calculated Body mass index is 30.73 kg/m .    Weight gain started around 29 year of age. Worse after pregnancy.  Was recently diagnosed with Hashimoto's and that initiated appointment with Weight Loss Clinic      ASSESSMENT & PLAN:    Problem List Items Addressed This Visit    None  Visit Diagnoses       Class 1 obesity due to excess calories with serious comorbidity and body mass index (BMI) of 30.0 to 30.9 in adult    -  Primary    Relevant Medications    liraglutide - Weight Management (SAXENDA) 18 MG/3ML pen    insulin pen needle (31G X 5 MM) 31G X 5 MM miscellaneous    Other Relevant Orders    NUTRITION REFERRAL    Sleep Study Referral    Weight gain        Screening for endocrine, nutritional, metabolic and immunity disorder        Relevant Orders    Vitamin D Deficiency    Hemoglobin A1c    Sleep disturbance        Relevant Medications    liraglutide - Weight Management (SAXENDA) 18 MG/3ML pen    Other Relevant Orders    Sleep Study Referral             PROGRAM OVERVIEW  Reviewed options at [facility] including provider visits, dietician, 24 week healthy lifestyle program, health coaching, food supplements, Get Moving program, and psychological support.  All questions about weight loss program were answered.      MEDICATIONS:  We discussed healthy habits to assist with weight loss. We reviewed medications associated with weight gain. We discussed the role of " "pharmacological agents in the treatment of obesity and the \"off-label\" use of medications in this practice. We reviewed medication that may assist with weight loss. Indications, contraindications, risks/benefits, and potential side effects were discussed. Saxenda was prescribed.  Patients was informed most likely will not be able to get due supply issues. Will notify clinic if can not find and metformin will be sent over. Both medications were discussed today and information provided. Discussed that medications must always be used together with lifestyle changes such as improvements in diet choices, portion control and establishing and maintaining a regular exercise program.     AOM Considerations:  Phentermine:  Not a candidate. Currently on Vyvanse  Topiramate: Took for migraines and no weight loss  GLP-1:   Would be a candidate. Will send over RX for Saxenda but discussed in detail supply issues    Naltrexone: option   Wellbutrin: Has taken in the past for weight loss and depression.  Did not lose weight   Metformin: option           PATIENT INSTRUCTIONS:  Get labs drawn  See dietitian  Increase water to 64 oz daily   Meet with sleep center  Will hold off on exercise goal until meets with sleep center       Follow up: Return to clinic in 3 months    50 minutes spent on the date of the encounter doing chart review, review of test results, patient visit and documentation       She has the following co-morbidities:        7/24/2023     2:21 PM   --   I have the following health issues associated with obesity Hypothyroidism   I have the following symptoms associated with obesity Depression    Fatigue     Has been feeling anxious lately.         7/24/2023     2:21 PM   Referring Provider   Please name the provider who referred you to Medical Weight Management  If you do not know, please answer \"I Don't Know\" Patria Fields Pa-C           7/24/2023     2:21 PM   Weight History   How concerned are you about your weight? " Very Concerned   I became overweight As an Adult   The following factors have contributed to my weight gain Mental Health Issues    Eating Wrong Types of Food    Eating Too Much    Lack of Exercise    Genetic (Runs in the Family)    Stress   I have tried the following methods to lose weight Watching Portions or Calories    Exercise    Weight Watchers    Atkins-type Diet (Low Carb/High Protein)    Meal Replacements    Fasting   My lowest weight since age 17 was 138   My highest weight since age 17 was 192   The most weight I have ever lost was (lbs) 15   I have the following family history of obesity/being overweight My mother is overweight    One or more of my siblings are overweight    Many of my relatives are overweight   How has your weight changed over the last year? Gained   How many pounds? 10       Tries to eat breakfast first thing AM.  3 meals daily. Not a big snacker.  34 oz of water daily.         7/24/2023     2:21 PM   Diet Recall Review with Patient   If you do eat lunch, what types of food do you typically eat? Cereal, fast food, fruit, peanut butter   If you do eat supper, what types of food do you typically eat? Cereal, fast food, meals my roommate makes   How many glasses of juice do you drink in a typical day? 0   How many of glasses of milk do you drink in a typical day? 0   How many 8oz glasses of sugar containing drinks such as Hcuy-Aid/sweet tea do you drink in a day? 1   How many cans/bottles of sugar pop/soda/tea/sports drinks do you drink in a day? 0   How many cans/bottles of diet pop/soda/tea or sports drink do you drink in a day? 1   How often do you have a drink of alcohol? Monthly or Less   If you do drink, how many drinks might you have in a day? 3-4           7/24/2023     2:21 PM   Eating Habits   Generally, my meals include foods like these bread, pasta, rice, potatoes, corn, crackers, sweet dessert, pop, or juice A Few Times a Week   Generally, my meals include foods like these  fried meats, brats, burgers, french fries, pizza, cheese, chips, or ice cream A Few Times a Week   Eat fast food (like McDonalds, Burger Norman, Taco Bell) A Few Times a Week   Eat at a buffet or sit-down restaurant Less Than Weekly   Eat most of my meals in front of the TV or computer A Few Times a Week   Often skip meals, eat at random times, have no regular eating times Almost Everyday   Rarely sit down for a meal but snack or graze throughout Less Than Weekly   Eat extra snacks between meals Less Than Weekly   Eat most of my food at the end of the day A Few Times a Week   Eat in the middle of the night or wake up at night to eat A Few Times a Week   Eat extra snacks to prevent or correct low blood sugar Never   Eat to prevent acid reflux or stomach pain Never   Worry about not having enough food to eat Never   I eat when I am depressed A Few Times a Week   I eat when I am stressed Once a Week   I eat when I am bored A Few Times a Week   I eat when I am anxious Less Than Weekly   I eat when I am happy or as a reward Less Than Weekly   I feel hungry all the time even if I just have eaten Never   Feeling full is important to me Almost Everyday   I finish all the food on my plate even if I am already full Never   I can't resist eating delicious food or walk past the good food/smell Never   I eat/snack without noticing that I am eating Less Than Weekly   I eat when I am preparing the meal Never   I eat more than usual when I see others eating Never   I have trouble not eating sweets, ice cream, cookies, or chips if they are around the house Almost Everyday   I think about food all day Never   What foods, if any, do you crave? Sweets/Candy/Chocolate           7/24/2023     2:21 PM   Amount of Food   I feel out of control when eating Never   I eat a large amount of food, like a loaf of bread, a box of cookies, a pint/quart of ice cream, all at once Never   I eat a large amount of food even when I am not hungry Never   I  eat rapidly Never   I eat alone because I feel embarrassed and do not want others to see how much I have eaten Never   I eat until I am uncomfortably full Monthly   I feel bad, disgusted, or guilty after I overeat Monthly           7/24/2023     2:21 PM   Activity/Exercise History   How much of a typical 12 hour day do you spend sitting? Most of the Day   How much of a typical 12 hour day do you spend lying down? Less Than Half the Day   How much of a typical day do you spend walking/standing? Less Than Half the Day   How many hours (not including work) do you spend on the TV/Video Games/Computer/Tablet/Phone? 2-3 Hours   How many times a week are you active for the purpose of exercise? Never   What keeps you from being more active? Too tired   How many total minutes do you spend doing some activity for the purpose of exercising when you exercise? 15-30 Minutes     No purposeful exercise but is active with daughter. Had a  at the beginning of the year but with the Hashimoto's diagnosis has been very tired      PAST MEDICAL HISTORY:  Past Medical History:   Diagnosis Date    Abnormal Pap smear of cervix 2011    MODERATE DYSPLASIA OF CERVIX    Asthma     Blood type O+ 5/31/2017    Herpes 2011    Major depressive disorder     depression on Meds    Migraine     Varicella without mention of complication     MUMPS ON 2007 7/24/2023     2:21 PM   Work/Social History Reviewed With Patient   My employment status is Full-Time   My job is Sales support at a [employer]   How much of your job is spent on the computer or phone? 100%   How many hours do you spend commuting to work daily? 8 min   What is your marital status? /In a Relationship   If in a relationship, is your significant other overweight? No   If you have children, are they overweight? No   Who do you live with? Grandparents,  & Daughter   Who does the food shopping? Everyone       Social History     Tobacco Use    Smoking status:  Never    Smokeless tobacco: Never   Substance Use Topics    Alcohol use: No     Comment: 1 DRINK PER week when not pregnant    Drug use: No            7/24/2023     2:21 PM   Mental Health History Reviewed With Patient   Have you ever been physically or sexually abused? No   How often in the past 2 weeks have you felt little interest or pleasure in doing things? More Than Half the Days   Over the past 2 weeks how often have you felt down, depressed, or hopeless? For Several Days           7/24/2023     2:21 PM   Sleep History Reviewed With Patient   How many hours do you sleep at night? 6       MEDICATIONS:   Current Outpatient Medications   Medication Sig Dispense Refill    albuterol 90 MCG/ACT inhaler Inhale 2 puffs into the lungs every 4 hours as needed.      Biotin 5 MG TABS       cetirizine (ZYRTEC) 10 MG tablet Take 1 tablet (10 mg) by mouth every evening 30 tablet 1    diazepam (VALIUM) 5 MG tablet TAKE 1 TABLET BY ORAL ROUTE 1 TIMES PER DAY AS NEEDED FOR EXTREME ANXIETY      Elderberry 500 MG CAPS       insulin pen needle (31G X 5 MM) 31G X 5 MM miscellaneous Use 1 pen needle daily or as directed. 100 each 1    levothyroxine (SYNTHROID/LEVOTHROID) 25 MCG tablet TAKE 1 TABLET BY MOUTH EVERY DAY 30 tablet 1    liraglutide - Weight Management (SAXENDA) 18 MG/3ML pen Week 1: 0.6 mg subcutaneous daily, Week 2: 1.2 mg daily, Week 3: 1.8 mg daily, Week 4: 2.4 mg daily, Week 5 & on: 3 mg daily 15 mL 1    NUVARING 0.12-0.015 MG/24HR vaginal ring       propranolol (INDERAL) 10 MG tablet TAKE 1 TABLET BY ORAL ROUTE 3 TIMES PER DAY AS NEEDED FOR ANXIETY AND PANIC      rizatriptan (MAXALT) 10 MG tablet TAKE 1 TABLET BY MOUTH ONCE AS NEEDED FOR HEADACHE FOR UP TO 1 DOSE.      venlafaxine (EFFEXOR XR) 150 MG 24 hr capsule Take 150 mg by mouth daily      venlafaxine (EFFEXOR) 75 MG tablet TAKE 1 TABLET BY MOUTH EVERY DAY (WITH A 150 MG = 225 MG)      vitamin E (TOCOPHEROL) 400 units (180 mg) capsule Take by mouth daily       VYVANSE 70 MG capsule take 1 capsule by mouth every morning      ferrous sulfate (FEROSUL) 325 (65 Fe) MG tablet Take 1 tablet (325 mg) by mouth daily (with breakfast)         ALLERGIES:   Allergies   Allergen Reactions    Bee Venom     Cephalosporins Swelling       ROS:    HEENT  H/O glaucoma:  no  Cardiovascular  CAD:   no  Palpitations:   no  HTN:    no  Gastrointestinal  GERD:   no  Constipation:   no  Liver Dz:   no  H/O Pancreatitis:  no  H/O Gallbladder Dz: no  Psychiatric  Moods Stable:  no  Anxiety:   yes  Depression:  no  Bipolar:  no  H/O ETOH/Drug Use: no  H/O eating disorder: no  Endocrine  PMH/FMH of MTC or MEN2:  no  Neurologic:  H/O seizures:   no  Headaches:  yes  Memory Impairment:  no    H/O kidney stones:  Yes twice. The last one was 8 years ago.   Kidney disease:  no  Current birth control:  Yes has Nuvaring    LABS/RECORDS REVIEWED:  TSH   Date Value Ref Range Status   06/13/2023 1.25 0.30 - 4.20 uIU/mL Final     Sodium   Date Value Ref Range Status   06/13/2023 145 136 - 145 mmol/L Final   12/07/2017 137 133 - 144 mmol/L Final     Potassium   Date Value Ref Range Status   06/13/2023 3.9 3.4 - 5.3 mmol/L Final   12/07/2017 4.0 3.4 - 5.3 mmol/L Final     Chloride   Date Value Ref Range Status   06/13/2023 107 98 - 107 mmol/L Final   12/07/2017 106 94 - 109 mmol/L Final     Carbon Dioxide   Date Value Ref Range Status   12/07/2017 25 20 - 32 mmol/L Final     Carbon Dioxide (CO2)   Date Value Ref Range Status   06/13/2023 24 22 - 29 mmol/L Final     Anion Gap   Date Value Ref Range Status   06/13/2023 14 7 - 15 mmol/L Final   12/07/2017 6 3 - 14 mmol/L Final     Glucose   Date Value Ref Range Status   06/13/2023 103 (H) 70 - 99 mg/dL Final   12/07/2017 123 (H) 70 - 99 mg/dL Final     Urea Nitrogen   Date Value Ref Range Status   06/13/2023 9.4 6.0 - 20.0 mg/dL Final   12/07/2017 10 7 - 30 mg/dL Final     Creatinine   Date Value Ref Range Status   06/13/2023 0.79 0.51 - 0.95 mg/dL Final  "  12/07/2017 0.71 0.52 - 1.04 mg/dL Final     GFR Estimate   Date Value Ref Range Status   06/13/2023 >90 >60 mL/min/1.73m2 Final     Comment:     eGFR calculated using 2021 CKD-EPI equation.   12/07/2017 >90 >60 mL/min/1.7m2 Final     Comment:     Non  GFR Calc     Calcium   Date Value Ref Range Status   06/13/2023 9.2 8.6 - 10.0 mg/dL Final   12/07/2017 8.3 (L) 8.5 - 10.1 mg/dL Final     Bilirubin Total   Date Value Ref Range Status   03/30/2023 0.2 <=1.2 mg/dL Final     Alkaline Phosphatase   Date Value Ref Range Status   03/30/2023 80 35 - 104 U/L Final     ALT   Date Value Ref Range Status   03/30/2023 10 10 - 35 U/L Final   12/15/2017 15 0 - 50 U/L Final     AST   Date Value Ref Range Status   03/30/2023 21 10 - 35 U/L Final   12/15/2017 16 0 - 45 U/L Final     WBC   Date Value Ref Range Status   12/15/2017 9.2 4.0 - 11.0 10e9/L Final     WBC Count   Date Value Ref Range Status   03/30/2023 8.4 4.0 - 11.0 10e3/uL Final     Hemoglobin   Date Value Ref Range Status   03/30/2023 14.2 11.7 - 15.7 g/dL Final   12/17/2017 12.8 11.7 - 15.7 g/dL Final     Hematocrit   Date Value Ref Range Status   03/30/2023 44.4 35.0 - 47.0 % Final   12/15/2017 38.3 35.0 - 47.0 % Final     MCV   Date Value Ref Range Status   03/30/2023 89 78 - 100 fL Final   12/15/2017 89 78 - 100 fl Final     Platelet Count   Date Value Ref Range Status   03/30/2023 294 150 - 450 10e3/uL Final   12/15/2017 208 150 - 450 10e9/L Final         BP Readings from Last 6 Encounters:   07/25/23 122/79   05/08/23 118/76   12/18/17 131/76   12/08/17 110/61   12/07/17 126/80   11/20/17 125/77       Pulse Readings from Last 6 Encounters:   07/25/23 90   05/08/23 113   12/18/17 79   06/13/17 91   05/24/17 83   05/24/17 83       PHYSICAL EXAM:  /79 (BP Location: Right arm, Patient Position: Chair, Cuff Size: Adult Large)   Pulse 90   Ht 5' 4\" (1.626 m)   Wt 179 lb (81.2 kg)   SpO2 97%   BMI 30.73 kg/m    GENERAL: Healthy, alert and no " distress  EYES: Eyes grossly normal to inspection.  No discharge or erythema, or obvious scleral/conjunctival abnormalities.  RESP: No audible wheeze, cough, or visible cyanosis.  No visible retractions or increased work of breathing.    SKIN: Visible skin clear. No significant rash, abnormal pigmentation or lesions.  NEURO: Cranial nerves grossly intact.  Mentation and speech appropriate for age.  PSYCH: Mentation appears normal, affect normal/bright, judgement and insight intact, normal speech and appearance well-groomed.    COUNSELING:   Reviewed obesity as a chronic disease and comprehensive management stratagies.      We discussed Bariatric Basics including:  -eating 3 meals daily  -eating protein first  -eating slowly, chewing food well  -avoiding/limiting calorie containing beverages  -limiting carbohydrates and changing to whole grains  -limiting restaurant or cafeteria eating to twice a week or less    We discussed the importance of restorative sleep and stress management in maintaining a healthy weight.  We discussed insulin resistance and glycemic index as it relates to appetite and weight control.   We discussed the importance of physical activity including cardiovascular and strength training in maintaining a healthier weight and explored viable options.  Patient education of above written in AVS.      Sincerely,    Yulissa Sofia PA-C

## 2023-07-26 NOTE — PROGRESS NOTES
"Luba is a 38 year old who is being evaluated via a billable video visit.      How would you like to obtain your AVS? MyChart  If the video visit is dropped, the invitation should be resent by: Text to cell phone: [phone]  Will anyone else be joining your video visit? {:[id number]}  {If patient encounters technical issues they should call [phone] :[id number]}        Video-Visit Details    Type of service:  Video Visit     Originating Location (pt. Location): {video visit patient location:798211::\"Home\"}  {PROVIDER LOCATION On-site should be selected for visits conducted from your clinic location or adjoining [facility] hospital, academic office, or other nearby [facility] building. Off-site should be selected for all other provider locations, including home:[id number]}  Distant Location (provider location):  {virtual location provider:407850}  Platform used for Video Visit: {Virtual Visit Platforms:395373::\"Job2Day\"}     MEDICAL WEIGHT LOSS INITIAL EVALUATION  DIAGNOSIS:  Obesity Class I    NUTRITION HISTORY:  Diet and exercise history per provider visit on 7/25/2023 as follows:    Tries to eat breakfast first thing AM.  3 meals daily. Not a big snacker.  34 oz of water daily.           7/24/2023     2:21 PM   Diet Recall Review with Patient   If you do eat lunch, what types of food do you typically eat? Cereal, fast food, fruit, peanut butter   If you do eat supper, what types of food do you typically eat? Cereal, fast food, meals my roommate makes   How many glasses of juice do you drink in a typical day? 0   How many of glasses of milk do you drink in a typical day? 0   How many 8oz glasses of sugar containing drinks such as Chuy-Aid/sweet tea do you drink in a day? 1   How many cans/bottles of sugar pop/soda/tea/sports drinks do you drink in a day? 0   How many cans/bottles of diet pop/soda/tea or sports drink do you drink in a day? 1   How often do you have a drink of alcohol? Monthly or Less   If you do drink, how many " drinks might you have in a day? 3-4              7/24/2023     2:21 PM   Eating Habits   Generally, my meals include foods like these bread, pasta, rice, potatoes, corn, crackers, sweet dessert, pop, or juice A Few Times a Week   Generally, my meals include foods like these fried meats, brats, burgers, french fries, pizza, cheese, chips, or ice cream A Few Times a Week   Eat fast food (like McDonalds, Burger Norman, Taco Bell) A Few Times a Week   Eat at a buffet or sit-down restaurant Less Than Weekly   Eat most of my meals in front of the TV or computer A Few Times a Week   Often skip meals, eat at random times, have no regular eating times Almost Everyday   Rarely sit down for a meal but snack or graze throughout Less Than Weekly   Eat extra snacks between meals Less Than Weekly   Eat most of my food at the end of the day A Few Times a Week   Eat in the middle of the night or wake up at night to eat A Few Times a Week   Eat extra snacks to prevent or correct low blood sugar Never   Eat to prevent acid reflux or stomach pain Never   Worry about not having enough food to eat Never   I eat when I am depressed A Few Times a Week   I eat when I am stressed Once a Week   I eat when I am bored A Few Times a Week   I eat when I am anxious Less Than Weekly   I eat when I am happy or as a reward Less Than Weekly   I feel hungry all the time even if I just have eaten Never   Feeling full is important to me Almost Everyday   I finish all the food on my plate even if I am already full Never   I can't resist eating delicious food or walk past the good food/smell Never   I eat/snack without noticing that I am eating Less Than Weekly   I eat when I am preparing the meal Never   I eat more than usual when I see others eating Never   I have trouble not eating sweets, ice cream, cookies, or chips if they are around the house Almost Everyday   I think about food all day Never   What foods, if any, do you crave? Sweets/Candy/Chocolate  "             7/24/2023     2:21 PM   Amount of Food   I feel out of control when eating Never   I eat a large amount of food, like a loaf of bread, a box of cookies, a pint/quart of ice cream, all at once Never   I eat a large amount of food even when I am not hungry Never   I eat rapidly Never   I eat alone because I feel embarrassed and do not want others to see how much I have eaten Never   I eat until I am uncomfortably full Monthly   I feel bad, disgusted, or guilty after I overeat Monthly              7/24/2023     2:21 PM   Activity/Exercise History   How much of a typical 12 hour day do you spend sitting? Most of the Day   How much of a typical 12 hour day do you spend lying down? Less Than Half the Day   How much of a typical day do you spend walking/standing? Less Than Half the Day   How many hours (not including work) do you spend on the TV/Video Games/Computer/Tablet/Phone? 2-3 Hours   How many times a week are you active for the purpose of exercise? Never   What keeps you from being more active? Too tired   How many total minutes do you spend doing some activity for the purpose of exercising when you exercise? 15-30 Minutes      No purposeful exercise but is active with daughter. Had a  at the beginning of the year but with the Hashimoto's diagnosis has been very tired      Additional Information: ***    ANTHROPOMETRICS:  Height: 64\"   Weight: 179 lbs  BMI:  30.75 kg/m2  NUTRITION DIAGNOSIS:   Obese class I related to overeating and poor lifestyle habits as evidence by patient's subjective statements and  BMI of 30.75 kg/m2   NUTRITION INTERVENTIONS  Nutrition Prescription:  Recommend modified energy- nutrient intake  Implementation:  Nutrition Education (Content):  Discussed portion sizes and plate method  Provided: Protein Sources for Weight Loss, Fiber Content in Food, Plate Method    Nutrition Education (Application):   Patient to practice goals as stated below  Patient verbalizes " understanding of diet by ***  Anticipate *** compliance    Goals:  ***      FOLLOW UP AND MONITORING:    Other  - follow up in 4-8 weeks.     TIME SPENT WITH PATIENT:   *** minutes        Luciana Castro RD, LD  Clinical Dietitian

## 2023-07-31 ENCOUNTER — VIRTUAL VISIT (OUTPATIENT)
Dept: SURGERY | Facility: CLINIC | Age: 38
End: 2023-07-31
Payer: COMMERCIAL

## 2023-07-31 DIAGNOSIS — E66.09 CLASS 1 OBESITY DUE TO EXCESS CALORIES WITH SERIOUS COMORBIDITY AND BODY MASS INDEX (BMI) OF 30.0 TO 30.9 IN ADULT: Primary | ICD-10-CM

## 2023-07-31 DIAGNOSIS — E66.811 CLASS 1 OBESITY DUE TO EXCESS CALORIES WITH SERIOUS COMORBIDITY AND BODY MASS INDEX (BMI) OF 30.0 TO 30.9 IN ADULT: Primary | ICD-10-CM

## 2023-07-31 PROCEDURE — 97802 MEDICAL NUTRITION INDIV IN: CPT | Mod: VID

## 2023-07-31 NOTE — PATIENT INSTRUCTIONS
Henry Alonzo-  Welcome to the [facility], [place]! It was great to visit with you and learn about your interest in weight loss. Below are the goals we discussed.  Goals:  Set alert on phone to remember to eat breakfast daily  Include protein with each meal + 1- 2 other food groups  Focus on alternatives to emotional eating (crafts, game on phone, music, adult coloring, get outside)    Nutrition Educational Materials:  Create a Plate (How to Build A Healthy Meal)  [email or web address]    Mindful Eating  [email or web address]     Building a Healthy Relationship with Food  [email or web address]     Please call [phone] to schedule your next visit with a Dietitian in 1 month.  Thanks!  Ronak Rivera RD, LD  [facility], [place]

## 2023-07-31 NOTE — PROGRESS NOTES
Virtual Visit Details    Type of service:  Video Visit     Originating Location (pt. Location): Other work  Distant Location (provider location):  On-site  Platform used for Video Visit: [facility] WEIGHT LOSS INITIAL EVALUATION  DIAGNOSIS:  Obesity Class I     NUTRITION HISTORY:  Diet and exercise history per provider visit on 7/25/2023 as follows:     Tries to eat breakfast first thing AM.  3 meals daily. Not a big snacker.  34 oz of water daily.           7/24/2023     2:21 PM   Diet Recall Review with Patient    Breakfast: skips 2X per week or banana/ peanut butter, what chex with almond milk @ 8-8:30 (up by 7 am)   If you do eat lunch, what types of food do you typically eat? Cereal, fast food 3X per week- burger, french fries or tacos or burrito, fruit, peanut butter/ handful pretzels or apple-picks up food at [employer] @ 11:00-1:00   If you do eat supper, what types of food do you typically eat? Cereal, fast food, meals my roommates makes-chili or pasta or casserole @ 5 pm   How many glasses of juice do you drink in a typical day? 0   How many of glasses of milk do you drink in a typical day? 0   How many 8oz glasses of sugar containing drinks such as Chuy-Aid/sweet tea do you drink in a day? 0-only diet green tea   How many cans/bottles of sugar pop/soda/tea/sports drinks do you drink in a day? 0   How many cans/bottles of diet pop/soda/tea or sports drink do you drink in a day? 1 Diet Dr. Pepper   How often do you have a drink of alcohol? Monthly or Less   If you do drink, how many drinks might you have in a day? 3-4              7/24/2023     2:21 PM   Eating Habits   Generally, my meals include foods like these bread, pasta, rice, potatoes, corn, crackers, sweet dessert, pop, or juice A Few Times a Week   Generally, my meals include foods like these fried meats, brats, burgers, french fries, pizza, cheese, chips, or ice cream A Few Times a Week   Eat fast food (like [employer], [employer], [employer]) A Few Times a Week   Eat at a buffet or sit-down restaurant Less Than Weekly   Eat most of my meals in front of the TV or computer A Few Times a Week   Often skip meals, eat at random times, have no regular eating times Almost Everyday   Rarely sit down for a meal but snack or graze throughout Less Than Weekly   Eat extra snacks between meals Less Than Weekly   Eat most of my food at the end of the day A Few Times a Week   Eat in the middle of the night or wake up at night to eat A Few Times a Week-trying to avoid and it is going better   Eat extra snacks to prevent or correct low blood sugar Never   Eat to prevent acid reflux or stomach pain Never   Worry about not having enough food to eat Never   I eat when I am depressed A Few Times a Week   I eat when I am stressed Once a Week   I eat when I am bored A Few Times a Week   I eat when I am anxious Less Than Weekly   I eat when I am happy or as a reward Less Than Weekly   I feel hungry all the time even if I just have eaten Never   Feeling full is important to me Almost Everyday   I finish all the food on my plate even if I am already full Never   I can't resist eating delicious food or walk past the good food/smell Never   I eat/snack without noticing that I am eating Less Than Weekly   I eat when I am preparing the meal Never   I eat more than usual when I see others eating Never   I have trouble not eating sweets, ice cream, cookies, or chips if they are around the house Almost Everyday   I think about food all day Never   What foods, if any, do you crave? Sweets/Candy/Chocolate              7/24/2023     2:21 PM   Amount of Food   I feel out of control when eating Never   I eat a large amount of food, like a loaf of bread, a box of cookies, a pint/quart of ice cream, all at once Never   I eat a large amount of food even when I am not hungry Never   I eat rapidly Never   I eat alone because I feel embarrassed and do not want others to see how much I have  "eaten Never   I eat until I am uncomfortably full Monthly   I feel bad, disgusted, or guilty after I overeat Monthly              7/24/2023     2:21 PM   Activity/Exercise History   How much of a typical 12 hour day do you spend sitting? Most of the Day   How much of a typical 12 hour day do you spend lying down? Less Than Half the Day   How much of a typical day do you spend walking/standing? Less Than Half the Day   How many hours (not including work) do you spend on the TV/Video Games/Computer/Tablet/Phone? 2-3 Hours   How many times a week are you active for the purpose of exercise? Never   What keeps you from being more active? Too tired   How many total minutes do you spend doing some activity for the purpose of exercising when you exercise? 15-30 Minutes      No purposeful exercise but is active with daughter. Had a  at the beginning of the year but with the Hashimoto's diagnosis has been very tired       Additional Information: Was recently diagnosed with Hashimoto's. Has gained abut 30 pounds in the past 3 years. Husbands grandparents live with the family(pt, spouse, daughter) for 6 months out of the year. Trying to avoid dairy (does not avoid cheese) and gluten. Does no like pork, fish.     MEDICATION FOR WEIGHT LOSS:  Plans to start Saxenda- has not started yes    ANTHROPOMETRICS:  Height: 64\"   Weight: 179 lbs  BMI:  30.75 kg/m2    NUTRITION DIAGNOSIS:   Obese class I related to overeating and poor lifestyle habits as evidence by patient's subjective statements and  BMI of 30.75 kg/m2     NUTRITION INTERVENTIONS  Nutrition Prescription:  Recommend modified energy- nutrient intake  Implementation:  Nutrition Education (Content):  Discussed portion sizes and plate method  Provided:  Plate Method, Building a healthy Relationship with Food, Mindful Eating     Nutrition Education (Application):   Patient to practice goals as stated below  Patient verbalizes understanding of diet by eating breakfast " more often  Anticipate fair-good compliance     Goals:  Set alert on phone to remember to eat breakfast daily  Include protein with each meal + 1- 2 other food groups  Focus on alternatives to emotional eating (crafts, game on phone, music, adult coloring, get outside)       FOLLOW UP AND MONITORING:    Other  - follow up in 4-8 weeks.     TIME SPENT WITH PATIENT:   31 minutes        Ronak Rivera RD, LD  [facility]

## 2023-08-01 ENCOUNTER — LAB (OUTPATIENT)
Dept: LAB | Facility: CLINIC | Age: 38
End: 2023-08-01
Payer: COMMERCIAL

## 2023-08-01 DIAGNOSIS — Z13.228 SCREENING FOR ENDOCRINE, NUTRITIONAL, METABOLIC AND IMMUNITY DISORDER: ICD-10-CM

## 2023-08-01 DIAGNOSIS — Z13.21 SCREENING FOR ENDOCRINE, NUTRITIONAL, METABOLIC AND IMMUNITY DISORDER: ICD-10-CM

## 2023-08-01 DIAGNOSIS — Z13.29 SCREENING FOR ENDOCRINE, NUTRITIONAL, METABOLIC AND IMMUNITY DISORDER: ICD-10-CM

## 2023-08-01 DIAGNOSIS — Z13.0 SCREENING FOR ENDOCRINE, NUTRITIONAL, METABOLIC AND IMMUNITY DISORDER: ICD-10-CM

## 2023-08-01 LAB — HBA1C MFR BLD: 5.6 % (ref 0–5.6)

## 2023-08-01 PROCEDURE — 36415 COLL VENOUS BLD VENIPUNCTURE: CPT

## 2023-08-01 PROCEDURE — 83036 HEMOGLOBIN GLYCOSYLATED A1C: CPT

## 2023-08-01 PROCEDURE — 82306 VITAMIN D 25 HYDROXY: CPT

## 2023-08-02 LAB — DEPRECATED CALCIDIOL+CALCIFEROL SERPL-MC: 49 UG/L (ref 20–75)

## 2023-08-06 PROBLEM — F41.8 MIXED ANXIETY AND DEPRESSIVE DISORDER: Status: ACTIVE | Noted: 2022-05-02

## 2023-08-06 PROBLEM — O09.90 SUPERVISION OF HIGH-RISK PREGNANCY: Status: RESOLVED | Noted: 2017-12-14 | Resolved: 2023-08-06

## 2023-08-06 PROBLEM — L65.9 LOSS OF HAIR: Status: ACTIVE | Noted: 2022-05-02

## 2023-08-07 ENCOUNTER — VIRTUAL VISIT (OUTPATIENT)
Dept: SLEEP MEDICINE | Facility: CLINIC | Age: 38
End: 2023-08-07
Attending: PHYSICIAN ASSISTANT
Payer: COMMERCIAL

## 2023-08-07 ENCOUNTER — OFFICE VISIT (OUTPATIENT)
Dept: ENDOCRINOLOGY | Facility: CLINIC | Age: 38
End: 2023-08-07
Payer: COMMERCIAL

## 2023-08-07 VITALS
TEMPERATURE: 98.7 F | HEART RATE: 84 BPM | OXYGEN SATURATION: 99 % | RESPIRATION RATE: 16 BRPM | DIASTOLIC BLOOD PRESSURE: 76 MMHG | HEIGHT: 64 IN | SYSTOLIC BLOOD PRESSURE: 122 MMHG | WEIGHT: 177.4 LBS | BODY MASS INDEX: 30.29 KG/M2

## 2023-08-07 VITALS
SYSTOLIC BLOOD PRESSURE: 122 MMHG | BODY MASS INDEX: 30.22 KG/M2 | DIASTOLIC BLOOD PRESSURE: 76 MMHG | HEIGHT: 64 IN | WEIGHT: 177 LBS

## 2023-08-07 DIAGNOSIS — E06.3 HASHIMOTO'S THYROIDITIS: Primary | ICD-10-CM

## 2023-08-07 DIAGNOSIS — Z72.820 LACK OF ADEQUATE SLEEP: ICD-10-CM

## 2023-08-07 DIAGNOSIS — R53.81 MALAISE AND FATIGUE: ICD-10-CM

## 2023-08-07 DIAGNOSIS — G47.10 ORGANIC HYPERSOMNIA: ICD-10-CM

## 2023-08-07 DIAGNOSIS — R06.00 DYSPNEA AND RESPIRATORY ABNORMALITY: Primary | ICD-10-CM

## 2023-08-07 DIAGNOSIS — E66.09 CLASS 1 OBESITY DUE TO EXCESS CALORIES WITH BODY MASS INDEX (BMI) OF 30.0 TO 30.9 IN ADULT, UNSPECIFIED WHETHER SERIOUS COMORBIDITY PRESENT: ICD-10-CM

## 2023-08-07 DIAGNOSIS — E66.811 CLASS 1 OBESITY DUE TO EXCESS CALORIES WITH SERIOUS COMORBIDITY AND BODY MASS INDEX (BMI) OF 30.0 TO 30.9 IN ADULT: ICD-10-CM

## 2023-08-07 DIAGNOSIS — R06.89 DYSPNEA AND RESPIRATORY ABNORMALITY: Primary | ICD-10-CM

## 2023-08-07 DIAGNOSIS — G47.9 SLEEP DISTURBANCE: ICD-10-CM

## 2023-08-07 DIAGNOSIS — E66.811 CLASS 1 OBESITY DUE TO EXCESS CALORIES WITH BODY MASS INDEX (BMI) OF 30.0 TO 30.9 IN ADULT, UNSPECIFIED WHETHER SERIOUS COMORBIDITY PRESENT: ICD-10-CM

## 2023-08-07 DIAGNOSIS — E66.09 CLASS 1 OBESITY DUE TO EXCESS CALORIES WITH SERIOUS COMORBIDITY AND BODY MASS INDEX (BMI) OF 30.0 TO 30.9 IN ADULT: ICD-10-CM

## 2023-08-07 DIAGNOSIS — R53.83 MALAISE AND FATIGUE: ICD-10-CM

## 2023-08-07 LAB
ERYTHROCYTE [DISTWIDTH] IN BLOOD BY AUTOMATED COUNT: 12.6 % (ref 10–15)
HCT VFR BLD AUTO: 41.1 % (ref 35–47)
HGB BLD-MCNC: 13.9 G/DL (ref 11.7–15.7)
MCH RBC QN AUTO: 29.3 PG (ref 26.5–33)
MCHC RBC AUTO-ENTMCNC: 33.8 G/DL (ref 31.5–36.5)
MCV RBC AUTO: 87 FL (ref 78–100)
PLATELET # BLD AUTO: 259 10E3/UL (ref 150–450)
RBC # BLD AUTO: 4.75 10E6/UL (ref 3.8–5.2)
T4 FREE SERPL-MCNC: 1.16 NG/DL (ref 0.9–1.7)
TSH SERPL DL<=0.005 MIU/L-ACNC: 0.83 UIU/ML (ref 0.3–4.2)
WBC # BLD AUTO: 6.8 10E3/UL (ref 4–11)

## 2023-08-07 PROCEDURE — 84439 ASSAY OF FREE THYROXINE: CPT | Performed by: PHYSICIAN ASSISTANT

## 2023-08-07 PROCEDURE — 84443 ASSAY THYROID STIM HORMONE: CPT | Performed by: PHYSICIAN ASSISTANT

## 2023-08-07 PROCEDURE — 36415 COLL VENOUS BLD VENIPUNCTURE: CPT | Performed by: PHYSICIAN ASSISTANT

## 2023-08-07 PROCEDURE — 85027 COMPLETE CBC AUTOMATED: CPT | Performed by: PHYSICIAN ASSISTANT

## 2023-08-07 PROCEDURE — 99213 OFFICE O/P EST LOW 20 MIN: CPT | Performed by: PHYSICIAN ASSISTANT

## 2023-08-07 PROCEDURE — 99244 OFF/OP CNSLTJ NEW/EST MOD 40: CPT | Mod: VID | Performed by: PHYSICIAN ASSISTANT

## 2023-08-07 ASSESSMENT — SLEEP AND FATIGUE QUESTIONNAIRES
HOW LIKELY ARE YOU TO NOD OFF OR FALL ASLEEP WHEN YOU ARE A PASSENGER IN A CAR FOR AN HOUR WITHOUT A BREAK: SLIGHT CHANCE OF DOZING
HOW LIKELY ARE YOU TO NOD OFF OR FALL ASLEEP WHILE SITTING QUIETLY AFTER LUNCH WITHOUT ALCOHOL: HIGH CHANCE OF DOZING
HOW LIKELY ARE YOU TO NOD OFF OR FALL ASLEEP WHILE WATCHING TV: HIGH CHANCE OF DOZING
HOW LIKELY ARE YOU TO NOD OFF OR FALL ASLEEP IN A CAR, WHILE STOPPED FOR A FEW MINUTES IN TRAFFIC: WOULD NEVER DOZE
HOW LIKELY ARE YOU TO NOD OFF OR FALL ASLEEP WHILE SITTING AND READING: HIGH CHANCE OF DOZING
HOW LIKELY ARE YOU TO NOD OFF OR FALL ASLEEP WHILE LYING DOWN TO REST IN THE AFTERNOON WHEN CIRCUMSTANCES PERMIT: HIGH CHANCE OF DOZING
HOW LIKELY ARE YOU TO NOD OFF OR FALL ASLEEP WHILE SITTING AND TALKING TO SOMEONE: WOULD NEVER DOZE
HOW LIKELY ARE YOU TO NOD OFF OR FALL ASLEEP WHILE SITTING INACTIVE IN A PUBLIC PLACE: SLIGHT CHANCE OF DOZING

## 2023-08-07 ASSESSMENT — PAIN SCALES - GENERAL: PAINLEVEL: NO PAIN (0)

## 2023-08-07 NOTE — PATIENT INSTRUCTIONS
"          MY TREATMENT INFORMATION FOR SLEEP APNEA-  Luba Vargas    DOCTOR : YANCY Madsen    Am I having a sleep study at a sleep center?  --->Due to normal delays, you will be contacted within 2-4 weeks to schedule    Am I having a home sleep study?  --->Watch the video for the device you are using:    -/drop off device-   https://www.Sensinodeube.com/watch?v=yGGFBdELGhk    -Disposable device sent out require phone/computer application-   https://www.Meusonic.com/watch?v=BCce_vbiwxE      Frequently asked questions:  1. What is Obstructive Sleep Apnea (PAULA)? PAULA is the most common type of sleep apnea. Apnea means, \"without breath.\"  Apnea is most often caused by narrowing or collapse of the upper airway as muscles relax during sleep.   Almost everyone has occasional apneas. Most people with sleep apnea have had brief interruptions at night frequently for many years.  The severity of sleep apnea is related to how frequent and severe the events are.   2. What are the consequences of PAULA? Symptoms include: feeling sleepy during the day, snoring loudly, gasping or stopping of breathing, trouble sleeping, and occasionally morning headaches or heartburn at night.  Sleepiness can be serious and even increase the risk of falling asleep while driving. Other health consequences may include development of high blood pressure and other cardiovascular disease in persons who are susceptible. Untreated PAULA  can contribute to heart disease, stroke and diabetes.   3. What are the treatment options? In most situations, sleep apnea is a lifelong disease that must be managed with daily therapy. Medications are not effective for sleep apnea and surgery is generally not considered until other therapies have been tried. Your treatment is your choice . Continuous Positive Airway (CPAP) works right away and is the therapy that is effective in nearly everyone. An oral device to hold your jaw forward is usually the next most " reliable option. Other options include postioning devices (to keep you off your back), weight loss, and surgery including a tongue pacing device. There is more detail about some of these options below.  4. Are my sleep studies covered by insurance? Although we will request verification of coverage, we advise you also check in advance of the study to ensure there is coverage.    Important tips for those choosing CPAP and similar devices  For new devices, sign up for device EBER to monitor your device for your followup visits  We encourage you to utilize the [employer] eber or website (myAir web (resmed.com) ) to monitor your therapy progress and share the data with your healthcare team when you discuss your sleep apnea.                                                    Know your equipment:  CPAP is continuous positive airway pressure that prevents obstructive sleep apnea by keeping the throat from collapsing while you are sleeping. In most cases, the device is  smart  and can slowly self-adjusts if your throat collapses and keeps a record every day of how well you are treated-this information is available to you and your care team.  BPAP is bilevel positive airway pressure that keeps your throat open and also assists each breath with a pressure boost to maintain adequate breathing.  Special kinds of BPAP are used in patients who have inadequate breathing from lung or heart disease. In most cases, the device is  smart  and can slowly self-adjusts to assist breathing. Like CPAP, the device keeps a record of how well you are treated.  Your mask is your connection to the device. You get to choose what feels most comfortable and the staff will help to make sure if fits. Here: are some examples of the different masks that are available:       Key points to remember on your journey with sleep apnea:  Sleep study.  PAP devices often need to be adjusted during a sleep study to show that they are effective and adjusted  right.  Good tips to remember: Try wearing just the mask during a quiet time during the day so your body adapts to wearing it. A humidifier is recommended for comfort in most cases to prevent drying of your nose and throat. Allergy medication from your provider may help you if you are having nasal congestion.  Getting settled-in. It takes more than one night for most of us to get used to wearing a mask. Try wearing just the mask during a quiet time during the day so your body adapts to wearing it. A humidifier is recommended for comfort in most cases. Our team will work with you carefully on the first day and will be in contact within 4 days and again at 2 and 4 weeks for advice and remote device adjustments. Your therapy is evaluated by the device each day.   Use it every night. The more you are able to sleep naturally for 7-8 hours, the more likely you will have good sleep and to prevent health risks or symptoms from sleep apnea. Even if you use it 4 hours it helps. Occasionally all of us are unable to use a medical therapy, in sleep apnea, it is not dangerous to miss one night.   Communicate. Call our skilled team on the number provided on the first day if your visit for problems that make it difficult to wear the device. Over 2 out of 3 patients can learn to wear the device long-term with help from our team. Remember to call our team or your sleep providers if you are unable to wear the device as we may have other solutions for those who cannot adapt to mask CPAP therapy. It is recommended that you sleep your sleep provider within the first 3 months and yearly after that if you are not having problems.   Use it for your health. We encourage use of CPAP masks during daytime quiet periods to allow your face and brain to adapt to the sensation of CPAP so that it will be a more natural sensation to awaken to at night or during naps. This can be very useful during the first few weeks or months of adapting to CPAP  though it does not help medically to wear CPAP during wakefulness and  should not be used as a strategy just to meet guidelines.  Take care of your equipment. Make sure you clean your mask and tubing using directions every day and that your filter and mask are replaced as recommended or if they are not working.     BESIDES CPAP, WHAT OTHER THERAPIES ARE THERE?    Positioning Device  Positioning devices are generally used when sleep apnea is mild and only occurs on your back.This example shows a pillow that straps around the waist. It may be appropriate for those whose sleep study shows milder sleep apnea that occurs primarily when lying flat on one's back. Preliminary studies have shown benefit but effectiveness at home may need to be verified by a home sleep test. These devices are generally not covered by medical insurance.  Examples of devices that maintain sleeping on the back to prevent snoring and mild sleep apnea.    Belt type body positioner  http://Endymed/    Electronic reminder  http://nightshifttherapy.Urbantech/            Oral Appliance  What is oral appliance therapy?  An oral appliance device fits on your teeth at night like a retainer used after having braces. The device is made by a specialized dentist and requires several visits over 1-2 months before a manufactured device is made to fit your teeth and is adjusted to prevent your sleep apnea. Once an oral device is working properly, snoring should be improved. A home sleep test may be recommended at that time if to determine whether the sleep apnea is adequately treated.       Some things to remember:  -Oral devices are often, but not always, covered by your medical insurance. Be sure to check with your insurance provider.   -If you are referred for oral therapy, you will be given a list of specialized dentists to consider or you may choose to visit the Web site of the American Academy of Dental Sleep Medicine  -Oral devices are less likely to work  if you have severe sleep apnea or are extremely overweight.     More detailed information  An oral appliance is a small acrylic device that fits over the upper and lower teeth  (similar to a retainer or a mouth guard). This device slightly moves jaw forward, which moves the base of the tongue forward, opens the airway, improves breathing for effective treat snoring and obstructive sleep apnea in perhaps 7 out of 10 people .  The best working devices are custom-made by a dental device  after a mold is made of the teeth 1, 2, 3.  When is an oral appliance indicated?  Oral appliance therapy is recommended as a first-line treatment for patients with primary snoring, mild sleep apnea, and for patients with moderate sleep apnea who prefer appliance therapy to use of CPAP4, 5. Severity of sleep apnea is determined by sleep testing and is based on the number of respiratory events per hour of sleep.   How successful is oral appliance therapy?  The success rate of oral appliance therapy in patients with mild sleep apnea is 75-80% while in patients with moderate sleep apnea it is 50-70%. The chance of success in patients with severe sleep apnea is 40-50%. The research also shows that oral appliances have a beneficial effect on the cardiovascular health of PAULA patients at the same magnitude as CPAP therapy7.  Oral appliances should be a second-line treatment in cases of severe sleep apnea, but if not completely successful then a combination therapy utilizing CPAP plus oral appliance therapy may be effective. Oral appliances tend to be effective in a broad range of patients although studies show that the patients who have the highest success are females, younger patients, those with milder disease, and less severe obesity. 3, 6.   Finding a dentist that practices dental sleep medicine  Specific training is available through the American Academy of Dental Sleep Medicine for dentists interested in working in the field  of sleep. To find a dentist who is educated in the field of sleep and the use of oral appliances, near you, visit the Web site of the American Academy of Dental Sleep Medicine.    References  1. Edita et al. Objectively measured vs self-reported compliance during oral appliance therapy for sleep-disordered breathing. Chest 2013; 144(5): 5793-6947.  2. Vilma, et al. Objective measurement of compliance during oral appliance therapy for sleep-disordered breathing. Thorax 2013; 68(1): 91-96.  3. Jl et al. Mandibular advancement devices in 620 men and women with PAULA and snoring: tolerability and predictors of treatment success. Chest 2004; 125: 7103-7602.  4. Irina et al. Oral appliances for snoring and PAULA: a review. Sleep 2006; 29: 244-262.  5. Renard et al. Oral appliance treatment for PAULA: an update. J Clin Sleep Med 2014; 10(2): 215-227.  6. Cristina et al. Predictors of OSAH treatment outcome. J Dent Res 2007; 86: 0321-3446.      Weight Loss:    Weight loss is a long-term strategy that may improve sleep apnea in some patients.    Weight management is a personal decision and the decision should be based on your interest and the potential benefits.  If you are interested in exploring weight loss strategies, the following discussion covers the impact on weight loss on sleep apnea and the approaches that may be successful.    Being overweight does not necessarily mean you will have health consequences.  Those who have BMI over 35 or over 27 with existing medical conditions carries greater risk.   Weight loss decreases severity of sleep apnea in most people with obesity. For those with mild obesity who have developed snoring with weight gain, even 15-30 pound weight loss can improve and occasionally eliminate sleep apnea.  Structured and life-long dietary and health habits are necessary to lose weight and keep healthier weight levels.     Though there may be significant health benefits from  weight loss, long-term weight loss is very difficult to achieve- studies show success with dietary management in less than 10% of people. In addition, substantial weight loss may require years of dietary control and may be difficult if patients have severe obesity. In these cases, surgical management may be considered.  Finally, older individuals who have tolerated obesity without health complications may be less likely to benefit from weight loss strategies.      [unfilled]    Surgery:    Surgery for obstructive sleep apnea is considered generally only when other therapies fail to work. Surgery may be discussed with you if you are having a difficult time tolerating CPAP and or when there is an abnormal structure that requires surgical correction.  Nose and throat surgeries often enlarge the airway to prevent collapse.  Most of these surgeries create pain for 1-2 weeks and up to half of the most common surgeries are not effective throughout life.  You should carefully discuss the benefits and drawbacks to surgery with your sleep provider and surgeon to determine if it is the best solution for you.   More information  Surgery for PAULA is directed at areas that are responsible for narrowing or complete obstruction of the airway during sleep.  There are a wide range of procedures available to enlarge and/or stabilize the airway to prevent blockage of breathing in the three major areas where it can occur: the palate, tongue, and nasal regions.  Successful surgical treatment depends on the accurate identification of the factors responsible for obstructive sleep apnea in each person.  A personalized approach is required because there is no single treatment that works well for everyone.  Because of anatomic variation, consultation with an examination by a sleep surgeon is a critical first step in determining what surgical options are best for each patient.  In some cases, examination during sedation may be recommended in  order to guide the selection of procedures.  Patients will be counseled about risks and benefits as well as the typical recovery course after surgery. Surgery is typically not a cure for a person s PAULA.  However, surgery will often significantly improve one s PAULA severity (termed  success rate ).  Even in the absence of a cure, surgery will decrease the cardiovascular risk associated with OSA7; improve overall quality of life8 (sleepiness, functionality, sleep quality, etc).      Palate Procedures:  Patients with PAULA often have narrowing of their airway in the region of their tonsils and uvula.  The goals of palate procedures are to widen the airway in this region as well as to help the tissues resist collapse.  Modern palate procedure techniques focus on tissue conservation and soft tissue rearrangement, rather than tissue removal.  Often the uvula is preserved in this procedure. Residual sleep apnea is common in patient after pharyngoplasty with an average reduction in sleep apnea events of 33%2.      Tongue Procedures:  ExamWhile patients are awake, the muscles that surround the throat are active and keep this region open for breathing. These muscles relax during sleep, allowing the tongue and other structures to collapse and block breathing.  There are several different tongue procedures available.  Selection of a tongue base procedure depends on characteristics seen on physical exam.  Generally, procedures are aimed at removing bulky tissues in this area or preventing the back of the tongue from falling back during sleep.  Success rates for tongue surgery range from 50-62%3.    Hypoglossal Nerve Stimulation:  Hypoglossal nerve stimulation has recently received approval from the [place] Food and Drug Administration for the treatment of obstructive sleep apnea.  This is based on research showing that the system was safe and effective in treating sleep apnea6.  Results showed that the median AHI score  decreased 68%, from 29.3 to 9.0. This therapy uses an implant system that senses breathing patterns and delivers mild stimulation to airway muscles, which keeps the airway open during sleep.  The system consists of three fully implanted components: a small generator (similar in size to a pacemaker), a breathing sensor, and a stimulation lead.  Using a small handheld remote, a patient turns the therapy on before bed and off upon awakening.    Candidates for this device must be greater than 18 years of age, have moderate to severe PAULA (AHI between 15-65), BMI less than 35, have tried CPAP/oral appliance for at least 8 weeks without success, and have appropriate upper airway anatomy (determined by a sleep endoscopy performed by Dr. David Rodriguez).    Hypoglossal Nerve Stimulation Pathway:    The sleep surgeon s office will work with the patient through the insurance prior-authorization process (including communications and appeals).    Nasal Procedures:  Nasal obstruction can interfere with nasal breathing during the day and night.  Studies have shown that relief of nasal obstruction can improve the ability of some patients to tolerate positive airway pressure therapy for obstructive sleep apnea1.  Treatment options include medications such as nasal saline, topical corticosteroid and antihistamine sprays, and oral medications such as antihistamines or decongestants. Non-surgical treatments can include external nasal dilators for selected patients. If these are not successful by themselves, surgery can improve the nasal airway either alone or in combination with these other options.      Combination Procedures:  Combination of surgical procedures and other treatments may be recommended, particularly if patients have more than one area of narrowing or persistent positional disease.  The success rate of combination surgery ranges from 66-80%2,3.    References  Sancho WISEMAN. The Role of the Nose in Snoring and Obstructive  Sleep Apnoea: An Update.  Eur Arch Otorhinolaryngol. 2011; 268: 1365-73.   Lynda SM; Shashi JA; Juan A JR; Pallanch JF; Abimael MB; Shelbi SG; Allen VIZCARRA. Surgical modifications of the upper airway for obstructive sleep apnea in adults: a systematic review and meta-analysis. SLEEP 2010;33(10):2846-7486. Suzi ENCISO. Hypopharyngeal surgery in obstructive sleep apnea: an evidence-based medicine review.  Arch Otolaryngol Head Neck Surg. 2006 Feb;132(2):206-13.  Jose YH1, Chris Y, Irving NICOLE. The efficacy of anatomically based multilevel surgery for obstructive sleep apnea. Otolaryngol Head Neck Surg. 2003 Oct;129(4):327-35.  Kezirian E, Goldberg A. Hypopharyngeal Surgery in Obstructive Sleep Apnea: An Evidence-Based Medicine Review. Arch Otolaryngol Head Neck Surg. 2006 Feb;132(2):206-13.  Aureliano CAROLINA et al. Upper-Airway Stimulation for Obstructive Sleep Apnea.  N Engl J Med. 2014 Jan 9;370(2):139-49.  Peker Y et al. Increased Incidence of Cardiovascular Disease in Middle-aged Men with Obstructive Sleep Apnea. Am J Respir Crit Care Med; 2002 166: 159-165  Manley EM et al. Studying Life Effects and Effectiveness of Palatopharyngoplasty (SLEEP) study: Subjective Outcomes of Isolated Uvulopalatopharyngoplasty. Otolaryngol Head Neck Surg. 2011; 144: 623-631.        WHAT IF I ONLY HAVE SNORING?    Mandibular advancement devices, lateral sleep positioning, long-term weight loss and treatment of nasal allergies have been shown to improve snoring.  Exercising tongue muscles with a game (https://apps.Re2you.Vyopta/us/eber/soundly-reduce-snoring/fh1666718157) or stimulating the tongue during the day with a device (https://doi.org/10.3390/djo76581835) have improved snoring in some individuals.    Remember to Drive Safe... Drive Alive     Sleep health profoundly affects your health, mood, and your safety.  Thirty three percent of the population (one in three of us) is not getting enough sleep and many have a sleep disorder. Not getting  enough sleep or having an untreated / undertreated sleep condition may make us sleepy without even knowing it. In fact, our driving could be dramatically impaired due to our sleep health. As your provider, here are some things I would like you to know about driving:     Here are some warning signs for impairment and dangerous drowsy driving:              -Having been awake more than 16 hours               -Looking tired               -Eyelid drooping              -Head nodding (it could be too late at this point)              -Driving for more than 30 minutes     Some things you could do to make the driving safer if you are experiencing some drowsiness:              -Stop driving and rest              -Call for transportation              -Make sure your sleep disorder is adequately treated     Some things that have been shown NOT to work when experiencing drowsiness while driving:              -Turning on the radio              -Opening windows              -Eating any  distracting  /  entertaining  foods (e.g., sunflower seeds, candy, or any other)              -Talking on the phone      Your decision may not only impact your life, but also the life of others. Please, remember to drive safe for yourself and all of us.

## 2023-08-07 NOTE — PROGRESS NOTES
Virtual Visit Details    Type of service:  Video Visit     Originating Location (pt. Location): Home    Distant Location (provider location):  On-site  Platform used for Video Visit: [facility]           Outpatient Sleep Medicine Consultation:      Name: Luba Vargas MRN# [id number]   Age: 38 year old YOB: 1985     Date of Consultation: August 7, 2023  Consultation is requested by: Yulissa Sofia, PA-C  [id number]  [place] [id number] Yulissa Sofia  Primary care provider: Heide Blackman       Reason for Sleep Consult:     Luba Vargas is sent by Yulissa Sofia for a sleep consultation regarding snoring.    Patient s Reason for visit  Luba Vargas main reason for visit:  Concern about poor sleep, snoring and snort arousals.   Patient states problem(s) started:    Luba Vargas's goals for this visit:             Assessment and Plan:     Summary Sleep Diagnoses:  snoring, snorting arousals, non-refreshing sleep, excessive daytime sleepiness (ESS 14), difficulty maintaining sleep and crowded oropharynx. PSG in 2011 showed AHI of 2, but RDI of 14.  The STOP-BANG score is 2/8. The pathophysiology, diagnosis and treatment of PAULA was discussed and a handout was provided.   Plan:    1. Recommend Polysomnogram (using 4% desaturation/Medicare/ AASM 1B scoring rules) to evaluate for obstructive sleep apnea. Patient is a poor candidate for Home Sleep Testing due to symptoms of PAULA but low pre-test probability of PAULA  (STOPBANG 2).  2. Advised her against drowsy driving.    3. Recommend weight management.   4. Discussed sleep hygiene, stimulus control to better consolidate sleep.       Summary Recommendations:    Orders Placed This Encounter   Procedures     Comprehensive Sleep Study         Summary Counseling:    Sleep Testing Reviewed  Obstructive Sleep Apnea Reviewed  Complications of Untreated Sleep Apnea Reviewed    Medical Decision-making:   Educational materials  provided in instructions    Total time spent reviewing medical records, history and physical examination, review of previous testing and interpretation as well as documentation on this date:50 minutes    CC: Yulissa Sofia          History of Present Illness:     Past Sleep Evaluations:  History of excessive daytime sleepiness (ESS 14/24). PSG with MSLT recommended. No records of actigraphy/sleep logs and UDS    Sleep study done at [facility] on 9/12/2011 (170#)- Sleep latency was 34, REM latency was 82, sleep efficiency was 87, AHI 2.4, RDI 14.1, lowest oxygen saturation was 85%. Following the overnight PSG, the patient underwent a series of 5 naps. The latencies to the naps were 3.55 min, 7.56 min, 7.28 min, 9.56min, and 7.57 min, respectively. This gives an average sleep latency of 7.26 minutes. There were no sleep onset REM periods.     She was diagnosed, but was already on Ritalin. She is currently on Vyvanse 70 mg in the am and feels that daytime sleepiness is significantly improved but not remedied completely.  Concern about poor sleep, snoring and snort arousals now.     Weight 177 lbs now.     SLEEP-WAKE SCHEDULE:     Work/School Days: Patient goes to school/work:  yes   Usually gets into bed at  8 pm with daughter. She falls asleep 8:30-9 am  Takes patient about   5 minutes to fall asleep  Has trouble falling asleep  0-1 nights per week  Wakes up in the middle of the night  3-4 times.  Wakes up due to  bathroom and uncertain reasons.   She has trouble falling back asleep   times a week.   It usually takes   to get back to sleep  Patient is usually up at  7 am  Uses alarm:  yes    Weekends/Non-work Days/All Other Days:  Usually gets into bed at   8:30-9 am  Takes patient about   to fall asleep  Patient is usually up at  7-8 am  Uses alarm:  no    Sleep Need  Patient gets   6 sleep on average. She does not feel refreshed.    Patient thinks she needs about   sleep    Luba Vargas prefers to  sleep in this position(s):  back, sides (R)  Patient states they do the following activities in bed:  Watch TV and electronics in bed.     Naps  Patient takes a purposeful nap   1-2 times a week and naps are usually   45 minutes in duration  She feels better after a nap:  yes  She dozes off unintentionally  no days per week  Patient has had a driving accident or near-miss due to sleepiness/drowsiness:  no      SLEEP DISRUPTIONS:    Breathing/Snoring  Patient snores: snore  Other people complain about her snoring:    Patient has been told she stops breathing in her sleep: no  She has issues with the following:      Movement:  Patient gets pain, discomfort, with an urge to move:   no  It happens when she is resting:     It happens more at night:     Patient has been told she kicks her legs at night:   no     Behaviors in Sleep:  Luba Vargas has experienced the following behaviors while sleeping:    She has experienced sudden muscle weakness during the day:  no      Is there anything else you would like your sleep provider to know:          CAFFEINE AND OTHER SUBSTANCES:    Patient consumes caffeinated beverages per day:   1-2  Last caffeine use is usually:    List of any prescribed or over the counter stimulants that patient takes:    List of any prescribed or over the counter sleep medication patient takes:  no  List of previous sleep medications that patient has tried:    Patient drinks alcohol to help them sleep:  no  Patient drinks alcohol near bedtime:  no    Family History:  Patient has a family member been diagnosed with a sleep disorder:  mom (PAULA)            SCALES:    EPWORTH SLEEPINESS SCALE         8/7/2023     3:08 PM    Robins Sleepiness Scale ( KAMRON Bran  1990-1997[facility] [place]/English - Final version - 21 Nov 07 - [facility] [place].)   Sitting and reading High chance of dozing   Watching TV High chance of dozing   Sitting, inactive in a public place (e.g. a theatre or a meeting) Slight  "chance of dozing   As a passenger in a car for an hour without a break Slight chance of dozing   Lying down to rest in the afternoon when circumstances permit High chance of dozing   Sitting and talking to someone Would never doze   Sitting quietly after a lunch without alcohol High chance of dozing   In a car, while stopped for a few minutes in traffic Would never doze   Vale Score (MC) 14   Vale Score (Sleep) 14         INSOMNIA SEVERITY INDEX (BERE)          8/7/2023     3:07 PM   Insomnia Severity Index (BERE)   Difficulty falling asleep 2   Difficulty staying asleep 3   Problems waking up too early 4   How SATISFIED/DISSATISFIED are you with your CURRENT sleep pattern? 4   How NOTICEABLE to others do you think your sleep problem is in terms of impairing the quality of your life? 3   How WORRIED/DISTRESSED are you about your current sleep problem? 3   To what extent do you consider your sleep problem to INTERFERE with your daily functioning (e.g. daytime fatigue, mood, ability to function at work/daily chores, concentration, memory, mood, etc.) CURRENTLY? 4   BERE Total Score 23       Guidelines for Scoring/Interpretation:  Total score categories:  0-7 = No clinically significant insomnia   8-14 = Subthreshold insomnia   15-21 = Clinical insomnia (moderate severity)  22-28 = Clinical insomnia (severe)  Used via courtesy of www.FirstBestealth.va.gov with permission from Jaciel Mcdermott PhD., [facility]      STOP BANG         8/7/2023     3:09 PM   STOP BANG Questionnaire (  2008, the American Society of Anesthesiologists, Inc. [employer]   B/P Clinic: 122/76   BMI Clinic: 30.38         GAD7         No data to display                  CAGE-AID         No data to display                CAGE-AID reprinted with permission from the [place] Medical Journal, [place]. and HAIM [place], \"Conjoint screening questionnaires for alcohol and drug abuse\" [place] Medical Journal 94: 135-140, " 1995.      PATIENT HEALTH QUESTIONNAIRE-9 (PHQ - 9)         No data to display                Developed by Jameson Coto, Ashley Rae, Tomer Guerrero and colleagues, with an educational teena from [employer] No permission required to reproduce, translate, display or distribute.        Allergies:    Allergies   Allergen Reactions     Bee Venom      Cephalosporins Swelling       Medications:    Current Outpatient Medications   Medication Sig Dispense Refill     albuterol 90 MCG/ACT inhaler Inhale 2 puffs into the lungs every 4 hours as needed.       Biotin 5 MG TABS        cetirizine (ZYRTEC) 10 MG tablet Take 1 tablet (10 mg) by mouth every evening 30 tablet 1     diazepam (VALIUM) 5 MG tablet TAKE 1 TABLET BY ORAL ROUTE 1 TIMES PER DAY AS NEEDED FOR EXTREME ANXIETY       Elderberry 500 MG CAPS        insulin pen needle (31G X 5 MM) 31G X 5 MM miscellaneous Use 1 pen needle daily or as directed. 100 each 1     levothyroxine (SYNTHROID/LEVOTHROID) 25 MCG tablet TAKE 1 TABLET BY MOUTH EVERY DAY 30 tablet 1     liraglutide - Weight Management (SAXENDA) 18 MG/3ML pen Week 1: 0.6 mg subcutaneous daily, Week 2: 1.2 mg daily, Week 3: 1.8 mg daily, Week 4: 2.4 mg daily, Week 5 & on: 3 mg daily 15 mL 1     NUVARING 0.12-0.015 MG/24HR vaginal ring        propranolol (INDERAL) 10 MG tablet TAKE 1 TABLET BY ORAL ROUTE 3 TIMES PER DAY AS NEEDED FOR ANXIETY AND PANIC       rizatriptan (MAXALT) 10 MG tablet TAKE 1 TABLET BY MOUTH ONCE AS NEEDED FOR HEADACHE FOR UP TO 1 DOSE.       venlafaxine (EFFEXOR XR) 150 MG 24 hr capsule Take 150 mg by mouth daily       vitamin E (TOCOPHEROL) 400 units (180 mg) capsule Take by mouth daily       VYVANSE 70 MG capsule take 1 capsule by mouth every morning         Problem List:  Patient Active Problem List    Diagnosis Date Noted     Mixed anxiety and depressive disorder 05/02/2022     Priority: Medium     Loss of hair 05/02/2022     Priority: Medium     Blood type O+ 05/31/2017      Priority: Medium     Pregnancy, first, obstetrical care, first trimester 2017     Priority: Medium     Genital herpes simplex, unspecified site 2017     Priority: Medium     Hypersomnia 2012     Priority: Medium     Formatting of this note might be different from the original.  hypersomnia, concerta recommended       Insomnia 2012     Priority: Medium     Moderate dysplasia of cervix (ANDREW II) 2011     Priority: Medium     Formatting of this note might be different from the original.  At the 4-5 o'clock position on the cervix.   ANDREW II biopsy was from area that appeared to extend into to forniceal area at the 4-5 o'clock position of the upper vagina so referral made to Gyn Oncology for laser treatment.       Cervicalgia 2008     Priority: Medium     Shoulder pain 2008     Priority: Medium     Nonallopathic lesion of cervical region 2008     Priority: Medium     Problem list name updated by automated process. Provider to review       Nonallopathic lesion of thoracic region 2008     Priority: Medium     Problem list name updated by automated process. Provider to review       Major depressive disorder, recurrent episode, moderate (H) 2008     Priority: Medium     Mild intermittent asthma 2007     Priority: Medium     Migraine 2006     Priority: Medium     Formatting of this note might be different from the original.  Epic          Past Medical/Surgical History:  Past Medical History:   Diagnosis Date     Abnormal Pap smear of cervix     MODERATE DYSPLASIA OF CERVIX     Asthma      Blood type O+ 2017     Herpes 2011     Major depressive disorder     depression on Meds     Migraine      Restless legs syndrome (RLS) 1/3/2012     Supervision of high-risk pregnancy 2017      (spontaneous vaginal delivery) 2017     Varicella without mention of complication     MUMPS ON      Past Surgical History:   Procedure Laterality Date  "    CONIZATION LEEP  10/25/2011    Procedure:CONIZATION LEEP; Conization Loop Electrosurgical Excision, Laser to Left Vaginal Fornix ([employer]); Surgeon:LYNN KHAN; Location:[facility]     LASER CO2 VAGINA  10/25/2011    Procedure:LASER CO2 VAGINA; Surgeon:LYNN KHAN; Location:[facility]     MYRINGOTOMY, INSERT TUBE, COMBINED Right 10/2016    2 SETS     TONSILLECTOMY      AT 10 Y/O       Social History:  Social History     Socioeconomic History     Marital status:      Spouse name: Karsten     Number of children: 0     Years of education: Not on file     Highest education level: Not on file   Occupational History     Occupation: Client      Comment: [employer]   Tobacco Use     Smoking status: Never     Smokeless tobacco: Never   Vaping Use     Vaping Use: Never used   Substance and Sexual Activity     Alcohol use: No     Comment: 1 DRINK PER week when not pregnant     Drug use: No     Sexual activity: Yes     Partners: Male   Other Topics Concern     Not on file   Social History Narrative     Not on file     Social Determinants of Health     Financial Resource Strain: Not on file   Food Insecurity: Not on file   Transportation Needs: Not on file   Physical Activity: Not on file   Stress: Not on file   Social Connections: Not on file   Intimate Partner Violence: Not on file   Housing Stability: Not on file       Family History:  Family History   Problem Relation Age of Onset     Diabetes Mother      Thyroid Disease Father      Thyroid Disease Sister      Diabetes Maternal Grandmother      Lung Cancer Maternal Grandfather        Review of Systems:  A complete review of systems reviewed by me is negative with the exeption of what has been mentioned in the history of present illness.        Physical Examination:  Vitals: /76   Ht 1.626 m (5' 4\")   Wt 80.3 kg (177 lb)   LMP  (LMP Unknown)   BMI 30.38 kg/m    BMI= Body mass index is 30.38 kg/m .         GENERAL APPEARANCE: " alert and no distress  EYES: Eyes grossly normal to inspection  HENT: oropharynx crowded  NECK: no asymmetry, masses, or scars  RESP: breathing is non-labored  NEURO: mentation intact and speech normal  PSYCH: affect normal/bright  Mallampati Class:   Tonsillar Stage:          Data: All pertinent previous laboratory data reviewed     Recent Labs   Lab Test 06/13/23  1420 03/30/23  1421    138   POTASSIUM 3.9 3.7   CHLORIDE 107 101   CO2 24 23   ANIONGAP 14 14   * 85   BUN 9.4 7.9   CR 0.79 0.77   KIMBERLEY 9.2 9.4       Recent Labs   Lab Test 08/07/23  1056   WBC 6.8   RBC 4.75   HGB 13.9   HCT 41.1   MCV 87   MCH 29.3   MCHC 33.8   RDW 12.6          Recent Labs   Lab Test 03/30/23  1421   PROTTOTAL 7.3   ALBUMIN 4.2   BILITOTAL 0.2   ALKPHOS 80   AST 21   ALT 10       TSH (uIU/mL)   Date Value   06/13/2023 1.25   05/08/2023 1.75         Iron Sat Index   Date/Time Value Ref Range Status   03/30/2023 02:21 PM 23 15 - 46 % Final     Ferritin   Date/Time Value Ref Range Status   03/30/2023 02:21 PM 67 6 - 175 ng/mL Final       Britton Mckeon PA-C 8/7/2023

## 2023-08-07 NOTE — LETTER
8/7/2023         RE: Luba Vargas  [address]  [place] [address]        Dear Colleague,    Thank you for referring your patient, Luba Vargas, to the [facility]. Please see a copy of my visit note below.    Assessment/Plan :   Hashimoto's Thyroiditis. Luba still feels horrible. She is tired and achy and she still can't lose weight. We reviewed the signs and symptoms of low and excess thyroid hormone. We also reviewed her current laboratory results. Her thyroid levels look good. We will repeat testing today but if the level remains stable, her symptoms are not due to Hashimoto's. She is getting ready to start metformin and we discussed how this may help with some of her symptoms. I will contact her with the results and we will follow-up in 3-6 mos.      I have independently reviewed and interpreted labs, imaging as indicated.      Chief complaint:  Luba is a 38 year old female who returns for follow-up of Hashimoto's Thyroiditis.    I have reviewed Care Everywhere including [facility], [facility], [facility] , [facility], [facility] lab reports, imaging reports and provider notes as indicated.      HISTORY OF PRESENT ILLNESS  Luba has not noticed any improvement since starting levothyroxine. She has been taking 25 mcg of levothyroxine for the last 3 months. Earlier this year, Luba started to develop worsening fatigue, weight gain, anxiousness, irritability, dry skin and hair loss. She looked into possible causes and she saw that all of those symptoms point to Hashimoto's Thyroiditis. She scheduled an appt with her primary care provider and she was diagnosed with Hashimoto's. While she is TPO antibody positive, her thyroid hormone levels were well within normal range. However, she had some symptoms associated with thyroid inflammation, so we decided to try  low dose of levothyroxine.    Luba has not had  any problems with worsening anxiousness or heart palpitations. She has noticed that the swelling in her neck seems to be better. However, she is still exhausted and continues to struggle with weight gain. A referral was placed to the weight management clinic and she is working with their providers to improve her nutrition and activity. She was also given a prescription for metformin.    Endocrine relevant labs are as follows:     Latest Reference Range & Units 06/13/23 14:20   TSH 0.30 - 4.20 uIU/mL 1.25      Latest Reference Range & Units 03/30/23 14:22   Thyroid Peroxidase Antibody <35 IU/mL 197 (H)   (H): Data is abnormally high    REVIEW OF SYSTEMS  Answers submitted by the patient for this visit:  Symptoms you have experienced in the last 30 days (Submitted on 7/24/2023)  General Symptoms: Yes  Skin Symptoms: Yes  HENT Symptoms: Yes  EYE SYMPTOMS: No  HEART SYMPTOMS: No  LUNG SYMPTOMS: No  INTESTINAL SYMPTOMS: Yes  URINARY SYMPTOMS: No  GYNECOLOGIC SYMPTOMS: No  BREAST SYMPTOMS: No  SKELETAL SYMPTOMS: No  BLOOD SYMPTOMS: Yes  NERVOUS SYSTEM SYMPTOMS: No  MENTAL HEALTH SYMPTOMS: Yes  Please answer the questions below to tell us what conditions you are experiencing: (Submitted on 7/24/2023)  Ear pain: No  Ear discharge: Yes  Hearing loss: No  Tinnitus: No  Nosebleeds: No  Congestion: Yes  Sinus pain: No  Trouble swallowing: No   Voice hoarseness: No  Mouth sores: No  Sore throat: No  Tooth pain: No  Gum tenderness: No  Bleeding gums: No  Change in taste: No  Change in sense of smell: No  Dry mouth: No  Hearing aid used: No  Neck lump: No  Please answer the questions below to tell us what conditions you are experiencing: (Submitted on 7/24/2023)   (Submitted on 7/24/2023)  Changes in hair: Yes  Changes in moles/birth marks: No  Itching: Yes  Rashes: No  Changes in nails: No  Acne: No  Hair in places you don't want it: No  Change in facial hair: No  Warts: No  Non-healing sores: No  Scarring: No  Flaking of skin:  No  Color changes of hands/feet in cold : Yes  Sun sensitivity: Yes  Skin thickening: No  Please answer the questions below to tell us what conditions you are experiencing: (Submitted on 7/24/2023)  Heart burn or indigestion: Yes  Nausea: Yes  Vomiting: No  Abdominal pain: No  Bloating: Yes  Constipation: Yes  Diarrhea: Yes  Blood in stool: No  Black stools: No  Rectal or Anal pain: No  Fecal incontinence: No  Yellowing of skin or eyes: No  Vomit with blood: No  Change in stools: No  Please answer the questions below to tell us what condition you are experiencing: (Submitted on 7/24/2023)  Anemia: No  Swollen glands: Yes  Easy bleeding or bruising: No  Edema or swelling: No  Please answer the questions below to tell us what condition you are experiencing: (Submitted on 7/24/2023)  Nervous or Anxious: Yes  Depression: Yes  Trouble sleeping: Yes  Trouble thinking or concentrating: Yes  Mood changes: Yes  Panic attacks: No    Endocrine: positive for thyroid disorder  Skin: positive for hair changes, negative for, pruritis, hyperpigmentation, facial redness  Eyes: negative for, visual blurring, redness, tearing  Ears/Nose/Throat: negative for, nasal congestion, postnasal drainage, persistent sore throat, hoarseness  Respiratory: No shortness of breath, dyspnea on exertion, cough, or hemoptysis  Cardiovascular: negative for, irregular heart beat, chest pain, lower extremity edema, and exercise intolerance  Gastrointestinal: negative for, nausea, vomiting, constipation, and diarrhea  Genitourinary: negative for, nocturia, dysuria, frequency, and urgency  Musculoskeletal: negative for, muscular weakness, and nocturnal cramping  Neurologic: negative for, numbness or tingling of hands, numbness or tingling of feet, and tremor  Psychiatric: positive for feeling anxious  Hematologic/Lymphatic/Immunologic: negative    Past Medical History  Past Medical History:   Diagnosis Date     Abnormal Pap smear of cervix 2011    MODERATE  DYSPLASIA OF CERVIX     Asthma      Blood type O+ 2017     Herpes 2011     Major depressive disorder     depression on Meds     Migraine      Supervision of high-risk pregnancy 2017      (spontaneous vaginal delivery) 2017     Varicella without mention of complication     MUMPS ON        Medications  Current Outpatient Medications   Medication Sig Dispense Refill     albuterol 90 MCG/ACT inhaler Inhale 2 puffs into the lungs every 4 hours as needed.       Biotin 5 MG TABS        cetirizine (ZYRTEC) 10 MG tablet Take 1 tablet (10 mg) by mouth every evening 30 tablet 1     diazepam (VALIUM) 5 MG tablet TAKE 1 TABLET BY ORAL ROUTE 1 TIMES PER DAY AS NEEDED FOR EXTREME ANXIETY       Elderberry 500 MG CAPS        insulin pen needle (31G X 5 MM) 31G X 5 MM miscellaneous Use 1 pen needle daily or as directed. 100 each 1     levothyroxine (SYNTHROID/LEVOTHROID) 25 MCG tablet TAKE 1 TABLET BY MOUTH EVERY DAY 30 tablet 1     NUVARING 0.12-0.015 MG/24HR vaginal ring        propranolol (INDERAL) 10 MG tablet TAKE 1 TABLET BY ORAL ROUTE 3 TIMES PER DAY AS NEEDED FOR ANXIETY AND PANIC       rizatriptan (MAXALT) 10 MG tablet TAKE 1 TABLET BY MOUTH ONCE AS NEEDED FOR HEADACHE FOR UP TO 1 DOSE.       venlafaxine (EFFEXOR XR) 150 MG 24 hr capsule Take 150 mg by mouth daily       vitamin E (TOCOPHEROL) 400 units (180 mg) capsule Take by mouth daily       VYVANSE 70 MG capsule take 1 capsule by mouth every morning       liraglutide - Weight Management (SAXENDA) 18 MG/3ML pen Week 1: 0.6 mg subcutaneous daily, Week 2: 1.2 mg daily, Week 3: 1.8 mg daily, Week 4: 2.4 mg daily, Week 5 & on: 3 mg daily 15 mL 1       Allergies  Allergies   Allergen Reactions     Bee Venom      Cephalosporins Swelling         Family History  family history includes Diabetes in her maternal grandmother and mother; Lung Cancer in her maternal grandfather; Thyroid Disease in her father and sister.    Social History  Social History  "    Tobacco Use     Smoking status: Never     Smokeless tobacco: Never   Substance Use Topics     Alcohol use: No     Comment: 1 DRINK PER week when not pregnant     Drug use: No       Physical Exam  /76 (BP Location: Left arm, Patient Position: Chair, Cuff Size: Adult Regular)   Pulse 84   Temp 98.7  F (37.1  C) (Tympanic)   Resp 16   Ht 1.626 m (5' 4.02\")   Wt 80.5 kg (177 lb 6.4 oz)   LMP  (LMP Unknown)   SpO2 99%   Breastfeeding No   BMI 30.44 kg/m    Body mass index is 30.44 kg/m .  GENERAL :  In no apparent distress  SKIN: Normal color, normal temperature, texture.  No hirsutism, alopecia or purple striae.     EYES: PERRL, EOMI, No scleral icterus,  No proptosis, conjunctival redness, stare, retraction  NECK: No visible masses. No palpable adenopathy, or masses. No carotid bruits.   THYROID:  Normal, nontender, smooth / firm texture,  no nodules, no Bruit   RESP: Lungs clear to auscultation bilaterally  CARDIAC: Regular rate and rhythm, normal S1 S2, without murmurs, rubs or gallops    NEURO: awake, alert, responds appropriately to questions.  Cranial nerves intact.   Moves all extremities; Gait normal.  No tremor of the outstretched hand.    EXTREMITIES: No clubbing, cyanosis or edema.            Again, thank you for allowing me to participate in the care of your patient.        Sincerely,        Patria Fields PA-C  "

## 2023-08-07 NOTE — PROGRESS NOTES
Assessment/Plan :   Hashimoto's Thyroiditis. Luba still feels horrible. She is tired and achy and she still can't lose weight. We reviewed the signs and symptoms of low and excess thyroid hormone. We also reviewed her current laboratory results. Her thyroid levels look good. We will repeat testing today but if the level remains stable, her symptoms are not due to Hashimoto's. She is getting ready to start metformin and we discussed how this may help with some of her symptoms. I will contact her with the results and we will follow-up in 3-6 mos.      I have independently reviewed and interpreted labs, imaging as indicated.      Chief complaint:  Luba is a 38 year old female who returns for follow-up of Hashimoto's Thyroiditis.    I have reviewed Care Everywhere including [facility], [facility], [facility] , [facility], [place] lab reports, imaging reports and provider notes as indicated.      HISTORY OF PRESENT ILLNESS  Luba has not noticed any improvement since starting levothyroxine. She has been taking 25 mcg of levothyroxine for the last 3 months. Earlier this year, Luba started to develop worsening fatigue, weight gain, anxiousness, irritability, dry skin and hair loss. She looked into possible causes and she saw that all of those symptoms point to Hashimoto's Thyroiditis. She scheduled an appt with her primary care provider and she was diagnosed with Hashimoto's. While she is TPO antibody positive, her thyroid hormone levels were well within normal range. However, she had some symptoms associated with thyroid inflammation, so we decided to try  low dose of levothyroxine.    Luba has not had any problems with worsening anxiousness or heart palpitations. She has noticed that the swelling in her neck seems to be better. However, she is still exhausted and continues to struggle with weight gain. A referral was placed to the weight management clinic and  she is working with their providers to improve her nutrition and activity. She was also given a prescription for metformin.    Endocrine relevant labs are as follows:     Latest Reference Range & Units 06/13/23 14:20   TSH 0.30 - 4.20 uIU/mL 1.25      Latest Reference Range & Units 03/30/23 14:22   Thyroid Peroxidase Antibody <35 IU/mL 197 (H)   (H): Data is abnormally high    REVIEW OF SYSTEMS  Answers submitted by the patient for this visit:  Symptoms you have experienced in the last 30 days (Submitted on 7/24/2023)  General Symptoms: Yes  Skin Symptoms: Yes  HENT Symptoms: Yes  EYE SYMPTOMS: No  HEART SYMPTOMS: No  LUNG SYMPTOMS: No  INTESTINAL SYMPTOMS: Yes  URINARY SYMPTOMS: No  GYNECOLOGIC SYMPTOMS: No  BREAST SYMPTOMS: No  SKELETAL SYMPTOMS: No  BLOOD SYMPTOMS: Yes  NERVOUS SYSTEM SYMPTOMS: No  MENTAL HEALTH SYMPTOMS: Yes  Please answer the questions below to tell us what conditions you are experiencing: (Submitted on 7/24/2023)  Ear pain: No  Ear discharge: Yes  Hearing loss: No  Tinnitus: No  Nosebleeds: No  Congestion: Yes  Sinus pain: No  Trouble swallowing: No   Voice hoarseness: No  Mouth sores: No  Sore throat: No  Tooth pain: No  Gum tenderness: No  Bleeding gums: No  Change in taste: No  Change in sense of smell: No  Dry mouth: No  Hearing aid used: No  Neck lump: No  Please answer the questions below to tell us what conditions you are experiencing: (Submitted on 7/24/2023)   (Submitted on 7/24/2023)  Changes in hair: Yes  Changes in moles/birth marks: No  Itching: Yes  Rashes: No  Changes in nails: No  Acne: No  Hair in places you don't want it: No  Change in facial hair: No  Warts: No  Non-healing sores: No  Scarring: No  Flaking of skin: No  Color changes of hands/feet in cold : Yes  Sun sensitivity: Yes  Skin thickening: No  Please answer the questions below to tell us what conditions you are experiencing: (Submitted on 7/24/2023)  Heart burn or indigestion: Yes  Nausea: Yes  Vomiting:  No  Abdominal pain: No  Bloating: Yes  Constipation: Yes  Diarrhea: Yes  Blood in stool: No  Black stools: No  Rectal or Anal pain: No  Fecal incontinence: No  Yellowing of skin or eyes: No  Vomit with blood: No  Change in stools: No  Please answer the questions below to tell us what condition you are experiencing: (Submitted on 2023)  Anemia: No  Swollen glands: Yes  Easy bleeding or bruising: No  Edema or swelling: No  Please answer the questions below to tell us what condition you are experiencing: (Submitted on 2023)  Nervous or Anxious: Yes  Depression: Yes  Trouble sleeping: Yes  Trouble thinking or concentrating: Yes  Mood changes: Yes  Panic attacks: No    Endocrine: positive for thyroid disorder  Skin: positive for hair changes, negative for, pruritis, hyperpigmentation, facial redness  Eyes: negative for, visual blurring, redness, tearing  Ears/Nose/Throat: negative for, nasal congestion, postnasal drainage, persistent sore throat, hoarseness  Respiratory: No shortness of breath, dyspnea on exertion, cough, or hemoptysis  Cardiovascular: negative for, irregular heart beat, chest pain, lower extremity edema, and exercise intolerance  Gastrointestinal: negative for, nausea, vomiting, constipation, and diarrhea  Genitourinary: negative for, nocturia, dysuria, frequency, and urgency  Musculoskeletal: negative for, muscular weakness, and nocturnal cramping  Neurologic: negative for, numbness or tingling of hands, numbness or tingling of feet, and tremor  Psychiatric: positive for feeling anxious  Hematologic/Lymphatic/Immunologic: negative    Past Medical History  Past Medical History:   Diagnosis Date    Abnormal Pap smear of cervix     MODERATE DYSPLASIA OF CERVIX    Asthma     Blood type O+ 2017    Herpes 2011    Major depressive disorder     depression on Meds    Migraine     Supervision of high-risk pregnancy 2017     (spontaneous vaginal delivery) 2017    Varicella  without mention of complication     MUMPS ON 2007       Medications  Current Outpatient Medications   Medication Sig Dispense Refill    albuterol 90 MCG/ACT inhaler Inhale 2 puffs into the lungs every 4 hours as needed.      Biotin 5 MG TABS       cetirizine (ZYRTEC) 10 MG tablet Take 1 tablet (10 mg) by mouth every evening 30 tablet 1    diazepam (VALIUM) 5 MG tablet TAKE 1 TABLET BY ORAL ROUTE 1 TIMES PER DAY AS NEEDED FOR EXTREME ANXIETY      Elderberry 500 MG CAPS       insulin pen needle (31G X 5 MM) 31G X 5 MM miscellaneous Use 1 pen needle daily or as directed. 100 each 1    levothyroxine (SYNTHROID/LEVOTHROID) 25 MCG tablet TAKE 1 TABLET BY MOUTH EVERY DAY 30 tablet 1    NUVARING 0.12-0.015 MG/24HR vaginal ring       propranolol (INDERAL) 10 MG tablet TAKE 1 TABLET BY ORAL ROUTE 3 TIMES PER DAY AS NEEDED FOR ANXIETY AND PANIC      rizatriptan (MAXALT) 10 MG tablet TAKE 1 TABLET BY MOUTH ONCE AS NEEDED FOR HEADACHE FOR UP TO 1 DOSE.      venlafaxine (EFFEXOR XR) 150 MG 24 hr capsule Take 150 mg by mouth daily      vitamin E (TOCOPHEROL) 400 units (180 mg) capsule Take by mouth daily      VYVANSE 70 MG capsule take 1 capsule by mouth every morning      liraglutide - Weight Management (SAXENDA) 18 MG/3ML pen Week 1: 0.6 mg subcutaneous daily, Week 2: 1.2 mg daily, Week 3: 1.8 mg daily, Week 4: 2.4 mg daily, Week 5 & on: 3 mg daily 15 mL 1       Allergies  Allergies   Allergen Reactions    Bee Venom     Cephalosporins Swelling         Family History  family history includes Diabetes in her maternal grandmother and mother; Lung Cancer in her maternal grandfather; Thyroid Disease in her father and sister.    Social History  Social History     Tobacco Use    Smoking status: Never    Smokeless tobacco: Never   Substance Use Topics    Alcohol use: No     Comment: 1 DRINK PER week when not pregnant    Drug use: No       Physical Exam  /76 (BP Location: Left arm, Patient Position: Chair, Cuff Size: Adult Regular)  "  Pulse 84   Temp 98.7  F (37.1  C) (Tympanic)   Resp 16   Ht 1.626 m (5' 4.02\")   Wt 80.5 kg (177 lb 6.4 oz)   LMP  (LMP Unknown)   SpO2 99%   Breastfeeding No   BMI 30.44 kg/m    Body mass index is 30.44 kg/m .  GENERAL :  In no apparent distress  SKIN: Normal color, normal temperature, texture.  No hirsutism, alopecia or purple striae.     EYES: PERRL, EOMI, No scleral icterus,  No proptosis, conjunctival redness, stare, retraction  NECK: No visible masses. No palpable adenopathy, or masses. No carotid bruits.   THYROID:  Normal, nontender, smooth / firm texture,  no nodules, no Bruit   RESP: Lungs clear to auscultation bilaterally  CARDIAC: Regular rate and rhythm, normal S1 S2, without murmurs, rubs or gallops    NEURO: awake, alert, responds appropriately to questions.  Cranial nerves intact.   Moves all extremities; Gait normal.  No tremor of the outstretched hand.    EXTREMITIES: No clubbing, cyanosis or edema.          "

## 2023-08-07 NOTE — NURSING NOTE
Patient declined completion of medication review by support staff because - Pt stated all were reviewed during an earlier appointment.     Has patient had flu shot for current/most recent flu season? If so, when? No    Is the patient currently in the state of [place]? YES    Visit mode:VIDEO    If the visit is dropped, the patient can be reconnected by: VIDEO VISIT: Text to cell phone: [phone]    Will anyone else be joining the visit? NO      How would you like to obtain your AVS? MyChart    Are changes needed to the allergy or medication list? NO    Reason for visit: Consult    Leandra Burch

## 2023-08-09 ENCOUNTER — TELEPHONE (OUTPATIENT)
Dept: ENDOCRINOLOGY | Facility: CLINIC | Age: 38
End: 2023-08-09
Payer: COMMERCIAL

## 2023-08-17 ENCOUNTER — TELEPHONE (OUTPATIENT)
Dept: ENDOCRINOLOGY | Facility: CLINIC | Age: 38
End: 2023-08-17
Payer: COMMERCIAL

## 2023-08-17 DIAGNOSIS — E06.3 HASHIMOTO'S THYROIDITIS: ICD-10-CM

## 2023-08-17 RX ORDER — LEVOTHYROXINE SODIUM 25 UG/1
25 TABLET ORAL DAILY
Qty: 90 TABLET | Refills: 0 | Status: SHIPPED | OUTPATIENT
Start: 2023-08-17 | End: 2023-11-10

## 2023-08-17 NOTE — TELEPHONE ENCOUNTER
[facility] Call Center    Phone Message    May a detailed message be left on voicemail: yes     Reason for Call: Medication Refill Request    Has the patient contacted the pharmacy for the refill? Yes   Name of medication being requested: levothyroxine (SYNTHROID/LEVOTHROID) 25 MCG tablet   Provider who prescribed the medication: Patria Fields  Pharmacy:   [id number] [place], [place] - [address]     Date medication is needed: ASAP    Pt is out of medication.        Action Taken: Message routed to:  [facility] ([facility] ENDO    Travel Screening: Not Applicable

## 2023-08-17 NOTE — TELEPHONE ENCOUNTER
Requested Prescriptions   Signed Prescriptions Disp Refills    levothyroxine (SYNTHROID/LEVOTHROID) 25 MCG tablet 90 tablet 0     Sig: Take 1 tablet (25 mcg) by mouth daily       There is no refill protocol information for this order

## 2023-09-29 DIAGNOSIS — E66.09 CLASS 1 OBESITY DUE TO EXCESS CALORIES WITH SERIOUS COMORBIDITY AND BODY MASS INDEX (BMI) OF 30.0 TO 30.9 IN ADULT: Primary | ICD-10-CM

## 2023-09-29 DIAGNOSIS — E66.811 CLASS 1 OBESITY DUE TO EXCESS CALORIES WITH SERIOUS COMORBIDITY AND BODY MASS INDEX (BMI) OF 30.0 TO 30.9 IN ADULT: Primary | ICD-10-CM

## 2023-09-29 RX ORDER — SEMAGLUTIDE 1 MG/.5ML
1 INJECTION, SOLUTION SUBCUTANEOUS
Qty: 2 ML | Refills: 0 | Status: SHIPPED | OUTPATIENT
Start: 2023-09-29 | End: 2023-10-10

## 2023-09-29 RX ORDER — METFORMIN HCL 500 MG
TABLET, EXTENDED RELEASE 24 HR ORAL
Qty: 180 TABLET | Refills: 0 | Status: SHIPPED | OUTPATIENT
Start: 2023-09-29 | End: 2023-09-29

## 2023-09-29 RX ORDER — SEMAGLUTIDE 1.7 MG/.75ML
1.7 INJECTION, SOLUTION SUBCUTANEOUS
Qty: 3 ML | Refills: 0 | Status: SHIPPED | OUTPATIENT
Start: 2023-09-29 | End: 2023-10-10

## 2023-09-29 NOTE — PROGRESS NOTES
Called patient and discussed bridging over from her Saxenda 3.0 to Wegovy 1.0 mg. Provided information and discussed possible higher risk of GI side effects.  Patient would like to try this.  RX sent over to pharmacy. She is already scheduled for next month.      Yulissa Sofia MS, PA-C

## 2023-10-05 RX ORDER — LEVOTHYROXINE SODIUM 25 UG/1
TABLET ORAL
Qty: 30 TABLET | Refills: 1 | OUTPATIENT
Start: 2023-10-05

## 2023-10-10 ENCOUNTER — MYC MEDICAL ADVICE (OUTPATIENT)
Dept: SURGERY | Facility: CLINIC | Age: 38
End: 2023-10-10
Payer: COMMERCIAL

## 2023-10-10 DIAGNOSIS — E66.09 CLASS 1 OBESITY DUE TO EXCESS CALORIES WITH SERIOUS COMORBIDITY AND BODY MASS INDEX (BMI) OF 30.0 TO 30.9 IN ADULT: ICD-10-CM

## 2023-10-10 DIAGNOSIS — E66.811 CLASS 1 OBESITY DUE TO EXCESS CALORIES WITH SERIOUS COMORBIDITY AND BODY MASS INDEX (BMI) OF 30.0 TO 30.9 IN ADULT: ICD-10-CM

## 2023-10-10 RX ORDER — SEMAGLUTIDE 1 MG/.5ML
1 INJECTION, SOLUTION SUBCUTANEOUS
Qty: 2 ML | Refills: 0 | Status: SHIPPED | OUTPATIENT
Start: 2023-10-10 | End: 2023-10-25

## 2023-10-10 RX ORDER — SEMAGLUTIDE 1.7 MG/.75ML
1.7 INJECTION, SOLUTION SUBCUTANEOUS
Qty: 3 ML | Refills: 0 | Status: SHIPPED | OUTPATIENT
Start: 2023-10-10 | End: 2024-01-15

## 2023-10-25 ENCOUNTER — VIRTUAL VISIT (OUTPATIENT)
Dept: SURGERY | Facility: CLINIC | Age: 38
End: 2023-10-25
Payer: COMMERCIAL

## 2023-10-25 VITALS — WEIGHT: 171 LBS | HEIGHT: 64 IN | BODY MASS INDEX: 29.19 KG/M2

## 2023-10-25 DIAGNOSIS — Z86.39 HISTORY OF OBESITY: Primary | ICD-10-CM

## 2023-10-25 DIAGNOSIS — J45.20 MILD INTERMITTENT ASTHMA, UNSPECIFIED WHETHER COMPLICATED: Chronic | ICD-10-CM

## 2023-10-25 DIAGNOSIS — R11.0 NAUSEA: ICD-10-CM

## 2023-10-25 DIAGNOSIS — E66.3 OVERWEIGHT WITH BODY MASS INDEX (BMI) OF 29 TO 29.9 IN ADULT: ICD-10-CM

## 2023-10-25 PROCEDURE — 99214 OFFICE O/P EST MOD 30 MIN: CPT | Mod: VID | Performed by: PHYSICIAN ASSISTANT

## 2023-10-25 RX ORDER — ONDANSETRON 4 MG/1
4 TABLET, ORALLY DISINTEGRATING ORAL EVERY 12 HOURS PRN
Qty: 10 TABLET | Refills: 0 | Status: SHIPPED | OUTPATIENT
Start: 2023-10-25 | End: 2024-08-01

## 2023-10-25 RX ORDER — SEMAGLUTIDE 1 MG/.5ML
1 INJECTION, SOLUTION SUBCUTANEOUS
Qty: 2 ML | Refills: 1 | Status: SHIPPED | OUTPATIENT
Start: 2023-10-25

## 2023-10-25 RX ORDER — SERDEXMETHYLPHENIDATE AND DEXMETHYLPHENIDATE 10.4; 52.3 MG/1; MG/1
CAPSULE ORAL
COMMUNITY
End: 2024-08-01

## 2023-10-25 NOTE — PATIENT INSTRUCTIONS
"Nice to talk with you today! Thank you for allowing me the privilege of caring for you.   We hope we provided you with the excellent service you deserve.     To ensure the quality of our services you may receive a patient satisfaction survey from an independent monitoring company.  The greatest compliment you can give is \"Likely to Recommend\"      Below is our plan we discussed.-  HONG Duenas      Plan:  Get lab drawn before next visit   3 meals daily  Increase water to 64 oz daily     Please call [phone] and schedule a follow up with Yulissa Sofia PA-C in 3 months.  If you need to reach me sooner you can do so by calling [phone].    Have a great day!     "

## 2023-10-25 NOTE — PROGRESS NOTES
"Luba is a 38 year old who is being evaluated via a billable video visit.      The patient has been notified of following:     \"This video visit will be conducted via a call between you and your physician/provider. We have found that certain health care needs can be provided without the need for an in-person physical exam.  This service lets us provide the care you need with a video conversation.  If a prescription is necessary we can send it directly to your pharmacy.  If lab work is needed we can place an order for that and you can then stop by our lab to have the test done at a later time.    Video visits are billed at different rates depending on your insurance coverage.  Please reach out to your insurance provider with any questions.    If during the course of the call the physician/provider feels a video visit is not appropriate, you will not be charged for this service.\"    Patient has given verbal consent for Video visit? Yes    How would you like to obtain your AVS? MyChart    If the video visit is dropped, the invitation should be resent by: MY CHART    Will anyone else be joining your video visit? No    I    Video-Visit Details    Type of service:  Video Visit    Video Start Time: 11:33 AM    Video End Time:11:58 PM    Originating Location (pt. Location): Home    Distant Location (provider location):  [facility] [place]     Platform used for Video Visit: [facility] Management Note         Luba Vargas  MRN:  [id number]  :  1985  JOS:  10/25/2023        Dear Heide Blackman MD,    I had the pleasure of seeing your patient Luba Vargas. She is a 38 year old female who I am continuing to see for treatment of obesity         2023     2:21 PM   --   I have the following health issues associated with obesity Hypothyroidism   I have the following symptoms associated with obesity Depression    Fatigue         Assessment "   Problem List Items Addressed This Visit       Mild intermittent asthma (Chronic)     Stable managed by primary         Overweight with body mass index (BMI) of 29 to 29.9 in adult     Continue Wegovy 1.0 mg         Relevant Orders    Basic metabolic panel    History of obesity - Primary     Congratulated patient on overall weight loss working on lifestyle and diet with medications. Initial BMI 30 and now 29.    Initial Weight (lbs): 179 lbs  Last Visits Weight: 179 lb (81.2 kg)  Cumulative weight loss (lbs): 8  Weight Loss Percentage: 4.47%          Other Visit Diagnoses       Nausea        Relevant Medications    ondansetron (ZOFRAN ODT) 4 MG ODT tab               PLAN/DISCUSSION:  Congratulated patient on weight loss. Will continue Wegovy 1.0 mg. Refill sent over. RX for zofran also sent over. Reinforced the importance of diet and lifestyle    Plan:  Get lab drawn before next visit   3 meals daily  Increase water to 64 oz daily       FOLLOW-UP:  3 months      SUBJECTIVE/OBJECTIVE:  Patient initially seen 7/25/2023 and was started on Saxenda. Got up to the 3.0 mg strength and then ran into supply issues.     Went 2 weeks between last dose of Saxenda 3.0 to first dose of Wegovy 1.0 mg this past weekend. The day following her first Wegovy injection got a migraine around 1 pm and vomited twice in that day. Yesterday and today has some nausea but not bad. Not much of an appetite. Was having some nausea with Saxenda as well that eventually improved.      Continues working with endo on thyroid. Still quite tired. Will be meeting with herbalist to go over diet that could help    Previous goals:  Get labs drawn - completed  See dietitian - completed  Increase water to 64 oz daily - getting in 48 oz   Meet with sleep center - in January   Will hold off on exercise goal until meets with sleep center    Anti-obesity medications:   Current: Wegovy 1.0 mg    Side effects:  Had migraine the day after and vomited that day only.  Denies abdominal pain, severe constipation, diarrhea, or heartburn    Failed/contraindicated:   Saxenda - ran into supply issues          10/25/2023     9:17 AM   Weight Loss Medication History Reviewed With Patient   Which weight loss medications are you currently taking on a regular basis? Wegovy   Are you having any side effects from the weight loss medication that we have prescribed you? Yes   If you are having side effects please describe: Migraines, vomiting         Recent diet changes: getting in 48 oz water will work on getting in more    Recent exercise/activity changes: has a gym membership but does not go.       CURRENT WEIGHT:   171 lbs 0 oz    Initial Weight (lbs): 179 lbs  Last Visits Weight: 179 lb (81.2 kg)  Cumulative weight loss (lbs): 8  Weight Loss Percentage: 4.47%        10/25/2023     9:17 AM   Changes and Difficulties   I have made the following changes to my diet since my last visit: More protein   With regards to my diet, I am still struggling with: consistency   I have made the following changes to my activity/exercise since my last visit: None   With regards to my activity/exercise, I am still struggling with: Going to the gym         MEDICATIONS:   Current Outpatient Medications   Medication Sig Dispense Refill    albuterol 90 MCG/ACT inhaler Inhale 2 puffs into the lungs every 4 hours as needed.      Biotin 5 MG TABS       cetirizine (ZYRTEC) 10 MG tablet Take 1 tablet (10 mg) by mouth every evening 30 tablet 1    diazepam (VALIUM) 5 MG tablet TAKE 1 TABLET BY ORAL ROUTE 1 TIMES PER DAY AS NEEDED FOR EXTREME ANXIETY      Elderberry 500 MG CAPS       levothyroxine (SYNTHROID/LEVOTHROID) 25 MCG tablet Take 1 tablet (25 mcg) by mouth daily 90 tablet 0    NUVARING 0.12-0.015 MG/24HR vaginal ring       ondansetron (ZOFRAN ODT) 4 MG ODT tab Take 1 tablet (4 mg) by mouth every 12 hours as needed for nausea 10 tablet 0    propranolol (INDERAL) 10 MG tablet TAKE 1 TABLET BY ORAL ROUTE 3 TIMES  "PER DAY AS NEEDED FOR ANXIETY AND PANIC      rizatriptan (MAXALT) 10 MG tablet TAKE 1 TABLET BY MOUTH ONCE AS NEEDED FOR HEADACHE FOR UP TO 1 DOSE.      Semaglutide-Weight Management (WEGOVY) 1 MG/0.5ML pen Inject 1 mg Subcutaneous every 7 days 2 mL 1    serdexmethylphen-dexmethylphen (AZSTARYS) 52.3-10.4 MG CAPS       venlafaxine (EFFEXOR XR) 150 MG 24 hr capsule Take 150 mg by mouth daily      vitamin E (TOCOPHEROL) 400 units (180 mg) capsule Take by mouth daily      insulin pen needle (31G X 5 MM) 31G X 5 MM miscellaneous Use 1 pen needle daily or as directed. 100 each 1    liraglutide - Weight Management (SAXENDA) 18 MG/3ML pen Week 1: 0.6 mg subcutaneous daily, Week 2: 1.2 mg daily, Week 3: 1.8 mg daily, Week 4: 2.4 mg daily, Week 5 & on: 3 mg daily (Patient not taking: Reported on 10/25/2023) 15 mL 1    Semaglutide-Weight Management (WEGOVY) 1.7 MG/0.75ML pen Inject 1.7 mg Subcutaneous every 7 days (Patient not taking: Reported on 10/25/2023) 3 mL 0    VYVANSE 70 MG capsule take 1 capsule by mouth every morning (Patient not taking: Reported on 10/25/2023)           ROS:  General  Fatigue: yes - not improved. Working with endo on thyroid  Sleep Quality: OK      PHYSICAL EXAM:  Objective    Ht 5' 4\" (1.626 m)   Wt 171 lb (77.6 kg)   BMI 29.35 kg/m    GENERAL: Healthy, alert and no distress  EYES: Eyes grossly normal to inspection.  No discharge or erythema, or obvious scleral/conjunctival abnormalities.  RESP: No audible wheeze, cough, or visible cyanosis.  No visible retractions or increased work of breathing.    SKIN: Visible skin clear. No significant rash, abnormal pigmentation or lesions.  NEURO: Cranial nerves grossly intact.  Mentation and speech appropriate for age.  PSYCH: Mentation appears normal, affect normal/bright, judgement and insight intact, normal speech and appearance well-groomed.        Sincerely,    Yulissa Sofia PA-C    32 minutes spent on the date of the encounter doing chart review, " review of test results, patient visit and documentation

## 2023-10-25 NOTE — ASSESSMENT & PLAN NOTE
Congratulated patient on overall weight loss working on lifestyle and diet with medications. Initial BMI 30 and now 29.    Initial Weight (lbs): 179 lbs  Last Visits Weight: 179 lb (81.2 kg)  Cumulative weight loss (lbs): 8  Weight Loss Percentage: 4.47%

## 2023-11-08 DIAGNOSIS — E06.3 HASHIMOTO'S THYROIDITIS: ICD-10-CM

## 2023-11-10 RX ORDER — LEVOTHYROXINE SODIUM 25 UG/1
25 TABLET ORAL DAILY
Qty: 90 TABLET | Refills: 0 | Status: SHIPPED | OUTPATIENT
Start: 2023-11-10

## 2023-11-10 NOTE — TELEPHONE ENCOUNTER
"Requested Prescriptions   Pending Prescriptions Disp Refills    levothyroxine (SYNTHROID/LEVOTHROID) 25 MCG tablet [Pharmacy Med Name: LEVOTHYROXINE 25 MCG TABLET] 90 tablet 0     Sig: TAKE 1 TABLET BY MOUTH EVERY DAY       Thyroid Protocol Passed - 11/10/2023  2:29 PM        Passed - Patient is 12 years or older        Passed - Recent (12 mo) or future (30 days) visit within the authorizing provider's specialty     Patient has had an office visit with the authorizing provider or a provider within the authorizing providers department within the previous 12 mos or has a future within next 30 days. See \"Patient Info\" tab in inbasket, or \"Choose Columns\" in Meds & Orders section of the refill encounter.              Passed - Medication is active on med list        Passed - Normal TSH on file in past 12 months     Recent Labs   Lab Test 08/07/23  1056   TSH 0.83              Passed - No active pregnancy on record     If patient is pregnant or has had a positive pregnancy test, please check TSH.          Passed - No positive pregnancy test in past 12 months     If patient is pregnant or has had a positive pregnancy test, please check TSH.               "

## 2024-01-15 ENCOUNTER — MYC REFILL (OUTPATIENT)
Dept: SURGERY | Facility: CLINIC | Age: 39
End: 2024-01-15

## 2024-01-15 DIAGNOSIS — E66.811 CLASS 1 OBESITY DUE TO EXCESS CALORIES WITH SERIOUS COMORBIDITY AND BODY MASS INDEX (BMI) OF 30.0 TO 30.9 IN ADULT: ICD-10-CM

## 2024-01-15 DIAGNOSIS — E66.09 CLASS 1 OBESITY DUE TO EXCESS CALORIES WITH SERIOUS COMORBIDITY AND BODY MASS INDEX (BMI) OF 30.0 TO 30.9 IN ADULT: ICD-10-CM

## 2024-01-15 RX ORDER — SEMAGLUTIDE 1.7 MG/.75ML
1.7 INJECTION, SOLUTION SUBCUTANEOUS
Qty: 6 ML | Refills: 0 | Status: SHIPPED | OUTPATIENT
Start: 2024-01-15 | End: 2024-05-28

## 2024-01-15 NOTE — TELEPHONE ENCOUNTER
"Wegovy 1.7 mg pens transferred to [facility].  Per pt: \"Last injection of Wagovy 1.0 was yesterday but feel like it isn't working as well as it did for my last 3 injections, that's why I was going to up it. No side effects at 1.0\"  Next appt 3/13/24.  Will fill 2 months per clinic protocol.    Shireen Pemberton RN    "

## 2024-02-16 ENCOUNTER — LAB (OUTPATIENT)
Dept: LAB | Facility: CLINIC | Age: 39
End: 2024-02-16
Payer: COMMERCIAL

## 2024-02-16 DIAGNOSIS — E66.3 OVERWEIGHT WITH BODY MASS INDEX (BMI) OF 29 TO 29.9 IN ADULT: ICD-10-CM

## 2024-02-16 DIAGNOSIS — E06.3 CHRONIC LYMPHOCYTIC THYROIDITIS: ICD-10-CM

## 2024-02-16 PROCEDURE — 84432 ASSAY OF THYROGLOBULIN: CPT | Mod: 90

## 2024-02-16 PROCEDURE — 86800 THYROGLOBULIN ANTIBODY: CPT

## 2024-02-16 PROCEDURE — 36415 COLL VENOUS BLD VENIPUNCTURE: CPT

## 2024-02-16 PROCEDURE — 84443 ASSAY THYROID STIM HORMONE: CPT

## 2024-02-16 PROCEDURE — 84439 ASSAY OF FREE THYROXINE: CPT

## 2024-02-16 PROCEDURE — 86376 MICROSOMAL ANTIBODY EACH: CPT

## 2024-02-16 PROCEDURE — 80048 BASIC METABOLIC PNL TOTAL CA: CPT

## 2024-02-16 PROCEDURE — 99000 SPECIMEN HANDLING OFFICE-LAB: CPT

## 2024-02-16 PROCEDURE — 84481 FREE ASSAY (FT-3): CPT

## 2024-02-17 LAB
ANION GAP SERPL CALCULATED.3IONS-SCNC: 12 MMOL/L (ref 7–15)
BUN SERPL-MCNC: 8.6 MG/DL (ref 6–20)
CALCIUM SERPL-MCNC: 8.8 MG/DL (ref 8.6–10)
CHLORIDE SERPL-SCNC: 105 MMOL/L (ref 98–107)
CREAT SERPL-MCNC: 0.82 MG/DL (ref 0.51–0.95)
DEPRECATED HCO3 PLAS-SCNC: 21 MMOL/L (ref 22–29)
EGFRCR SERPLBLD CKD-EPI 2021: >90 ML/MIN/1.73M2
GLUCOSE SERPL-MCNC: 99 MG/DL (ref 70–99)
POTASSIUM SERPL-SCNC: 4.1 MMOL/L (ref 3.4–5.3)
SODIUM SERPL-SCNC: 138 MMOL/L (ref 135–145)
T3FREE SERPL-MCNC: 3.9 PG/ML (ref 2–4.4)
T4 FREE SERPL-MCNC: 1.61 NG/DL (ref 0.9–1.7)
TSH SERPL DL<=0.005 MIU/L-ACNC: <0.01 UIU/ML (ref 0.3–4.2)

## 2024-02-19 LAB
THYROGLOB AB SERPL IA-ACNC: 85 IU/ML
THYROPEROXIDASE AB SERPL-ACNC: 49 IU/ML

## 2024-02-24 LAB — THYROGLOB SERPL-MCNC: 6.6 NG/ML
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
## 2024-03-13 ENCOUNTER — OFFICE VISIT (OUTPATIENT)
Dept: SURGERY | Facility: CLINIC | Age: 39
End: 2024-03-13
Payer: COMMERCIAL

## 2024-03-13 VITALS
BODY MASS INDEX: 24.07 KG/M2 | WEIGHT: 141 LBS | SYSTOLIC BLOOD PRESSURE: 110 MMHG | HEIGHT: 64 IN | HEART RATE: 100 BPM | OXYGEN SATURATION: 96 % | DIASTOLIC BLOOD PRESSURE: 68 MMHG

## 2024-03-13 DIAGNOSIS — E66.3 OVERWEIGHT WITH BODY MASS INDEX (BMI) OF 29 TO 29.9 IN ADULT: ICD-10-CM

## 2024-03-13 DIAGNOSIS — Z86.39 HISTORY OF OBESITY: ICD-10-CM

## 2024-03-13 DIAGNOSIS — J45.20 MILD INTERMITTENT ASTHMA, UNSPECIFIED WHETHER COMPLICATED: Chronic | ICD-10-CM

## 2024-03-13 PROCEDURE — 99213 OFFICE O/P EST LOW 20 MIN: CPT | Performed by: PHYSICIAN ASSISTANT

## 2024-03-13 RX ORDER — SEMAGLUTIDE 1 MG/.5ML
1 INJECTION, SOLUTION SUBCUTANEOUS
Qty: 6 ML | Refills: 0 | Status: SHIPPED | OUTPATIENT
Start: 2024-03-13 | End: 2024-08-01

## 2024-03-13 NOTE — PATIENT INSTRUCTIONS
"Nice to talk with you today! Thank you for allowing me the privilege of caring for you.   We hope we provided you with the excellent service you deserve.     To ensure the quality of our services you may receive a patient satisfaction survey from an independent monitoring company.  The greatest compliment you can give is \"Likely to Recommend\"      Below is our plan we discussed.-  HONG Duenas      Plan:  64+ oz water daily   Maintain at wegovy 1.0 mg  3.  Increase physical activity    Please  schedule a follow up with Yulissa Sofia PA-C in 3 months.  If you need to reach me sooner you can do so by calling [phone].    Have a great day!     "

## 2024-03-13 NOTE — ASSESSMENT & PLAN NOTE
Congratulated patient on overall weight loss using lifestyle, diet and medication.     Initial Weight (lbs): 179 lbs  Current Weight: 141  Cumulative weight loss (lbs): 38  Weight Loss Percentage: 21.23%

## 2024-05-21 ENCOUNTER — TELEPHONE (OUTPATIENT)
Dept: SURGERY | Facility: CLINIC | Age: 39
End: 2024-05-21
Payer: COMMERCIAL

## 2024-05-21 NOTE — TELEPHONE ENCOUNTER
[facility]    Fax: [phone]    Spec: [phone]    MO: [phone]

## 2024-05-28 ENCOUNTER — MYC REFILL (OUTPATIENT)
Dept: SURGERY | Facility: CLINIC | Age: 39
End: 2024-05-28
Payer: COMMERCIAL

## 2024-05-28 DIAGNOSIS — E66.09 CLASS 1 OBESITY DUE TO EXCESS CALORIES WITH SERIOUS COMORBIDITY AND BODY MASS INDEX (BMI) OF 30.0 TO 30.9 IN ADULT: ICD-10-CM

## 2024-05-28 DIAGNOSIS — E66.811 CLASS 1 OBESITY DUE TO EXCESS CALORIES WITH SERIOUS COMORBIDITY AND BODY MASS INDEX (BMI) OF 30.0 TO 30.9 IN ADULT: ICD-10-CM

## 2024-05-28 DIAGNOSIS — Z86.39 HISTORY OF OBESITY: ICD-10-CM

## 2024-05-28 RX ORDER — SEMAGLUTIDE 1 MG/.5ML
1 INJECTION, SOLUTION SUBCUTANEOUS
Qty: 6 ML | Refills: 0 | OUTPATIENT
Start: 2024-05-28

## 2024-05-28 RX ORDER — SEMAGLUTIDE 1.7 MG/.75ML
1.7 INJECTION, SOLUTION SUBCUTANEOUS
Qty: 6 ML | Refills: 0 | Status: SHIPPED | OUTPATIENT
Start: 2024-05-28 | End: 2024-08-01

## 2024-05-30 NOTE — TELEPHONE ENCOUNTER
Retail Pharmacy Prior Authorization Team   Phone: [phone]    Provider wrote new rx for next dosage - EPA approved, rx released to pharmacy once approved -

## 2024-07-31 NOTE — PROGRESS NOTES
"Luba is a 39 year old who is being evaluated via a billable video visit.      The patient has been notified of following:     \"This video visit will be conducted via a call between you and your physician/provider. We have found that certain health care needs can be provided without the need for an in-person physical exam.  This service lets us provide the care you need with a video conversation.  If a prescription is necessary we can send it directly to your pharmacy.  If lab work is needed we can place an order for that and you can then stop by our lab to have the test done at a later time.    Video visits are billed at different rates depending on your insurance coverage.  Please reach out to your insurance provider with any questions.    If during the course of the call the physician/provider feels a video visit is not appropriate, you will not be charged for this service.\"    Patient has given verbal consent for Video visit? Yes    How would you like to obtain your AVS? MyChart    If the video visit is dropped, the invitation should be resent by: My Chart    Will anyone else be joining your video visit? No    I    Video-Visit Details    Type of service:  Video Visit    Originating Location (pt. Location): Work in [place]     Distant Location (provider location):   [facility]    Platform used for Video Visit: iL[employer]    Video/phone Start Time: 2:16 PM    Video/phone  End Time:2:26 PM    Texted to join 2:07 PM, 2:12 PM. Called: 2:15 PM    2024      Return Medical Weight Management Note     Luba Vargas  MRN:  [id number]  :  1985    Assessment & Plan   Problem List Items Addressed This Visit       History of obesity - Primary     Patient was congratulated on wt loss success thus far. Healthy habits to assist with further weight loss were discussed. Luba will continue Wegovy titration.           Relevant Medications    Semaglutide-Weight Management (WEGOVY) " 1.7 MG/0.75ML pen     Other Visit Diagnoses       BMI 23.0-23.9, adult        Relevant Medications    Semaglutide-Weight Management (WEGOVY) 1.7 MG/0.75ML pen             Antiobesity medication history per Yulissa Sofia PA-C :  Phentermine (Lomaira, Adipex) Contraindicated. On a stimulant currently   Phentermine/Topiramate (Qsymia)     Bupropion/Naltrexone (Contrave)     Liraglutide (Saxenda) Successful but was having supply issues   Semaglutide (Wegovy) Taking currently    Tirzepatide (Zepbound)     Orlistat (Xenical/Hi)     Off-Label Medications for Obesity  Semaglutide (Ozempic)     Tirzepatide (Mounjaro)     Bupropion (Wellbutrin)     Metformin(Glucophage)     Topiramate (Topamax)     Naltexone          PATIENT INSTRUCTIONS:  Pt Instructions:  Increase to 1.7 mg Wegovy once weekly.    Goals:  I recommend eating breakfast within an hour of waking up and eating every 4-6 hours during the day and try minimize snacking between meals. Prioritizing veggies and proteins for food choices is also recommended as medically appropriate.  Great job with exercising - please continue to invest in yourself with regular exercise. Continue to strive for a range for 150-300 minutes of exercise for weight maintenance and incorporating strength training twice a week to help preserve muscle!      Follow up:    Call [phone] to schedule next visit in next available. Be in touch on Mychart for refills/concerns between visits    14 minutes spent on the date of the encounter doing chart review, history and exam, result review, counseling, developing plan of care, documentation, and further activities as noted      INTERVAL HISTORY:  Luba returns for medical weight management follow up.  Last seen on 3/13/2024 by Yulissa Sofia PA-C and plan at that time to continue Wegovy at 1 mg.    Feels that some weight loss was too high thyroid which is now fixed/corrected.     WEIGHT METRICS:  Body mass index is 23.69 kg/m .   Current  "Weight: 138 lb (62.6 kg)  Last Visits Weight: 141 lb (64 kg)  Initial Weight (lbs): 179 lbs  Cumulative weight loss (lbs): 41  Weight Loss Percentage: 22.91%    Wt Readings from Last 10 Encounters:   08/01/24 138 lb (62.6 kg)   03/13/24 141 lb (64 kg)   10/25/23 171 lb (77.6 kg)   08/07/23 177 lb (80.3 kg)   08/07/23 177 lb 6.4 oz (80.5 kg)   07/25/23 179 lb (81.2 kg)   05/08/23 182 lb 4.8 oz (82.7 kg)   12/14/17 214 lb (97.1 kg)   11/20/17 198 lb (89.8 kg)   05/24/17 180 lb 14.4 oz (82.1 kg)                 7/8/2024     8:33 AM   Weight Loss Medication History Reviewed With Patient   Which weight loss medications are you currently taking on a regular basis? Wegovy   Are you having any side effects from the weight loss medication that we have prescribed you? No     No bothersome side effects at 1.7 mg dose - but had more weight loss so tried to wean off, but increased hunger. Does report thinks she's gained weight since going down. Still lots of hunger at the 1 mg dose back to weekly.         7/8/2024     8:33 AM   Changes and Difficulties   I have made the following changes to my diet since my last visit: I don't over eat when eating a meal.   With regards to my diet, I am still struggling with: sweets   I have made the following changes to my activity/exercise since my last visit: started going to the gym   With regards to my activity/exercise, I am still struggling with: to go.   Breakfast: oatmeal/sausage adrienne  Lunch: bigger lunch then smaller dinner.     Gym - going twice if possible - goal of 4.     LABS:  Reviewed in Epic    2/16/2024 BMP CO2 21 otherwise normal    BP Readings from Last 6 Encounters:   03/13/24 110/68   08/07/23 122/76   08/07/23 122/76   07/25/23 122/79   05/08/23 118/76   12/18/17 131/76       Pulse Readings from Last 6 Encounters:   03/13/24 100   08/07/23 84   07/25/23 90   05/08/23 113   12/18/17 79   06/13/17 91       PE:  Ht 5' 4\" (1.626 m)   Wt 138 lb (62.6 kg)   BMI 23.69 kg/m  "   Phone visit    RESP: No audible wheeze, cough.  Speaking in full sentences  NEURO: Mentation and speech appropriate for age.  PSYCH: Mentation appears normal,        Sincerely,      Keira Hunter PA-C

## 2024-08-01 ENCOUNTER — VIRTUAL VISIT (OUTPATIENT)
Dept: SURGERY | Facility: CLINIC | Age: 39
End: 2024-08-01
Payer: COMMERCIAL

## 2024-08-01 VITALS — BODY MASS INDEX: 23.56 KG/M2 | HEIGHT: 64 IN | WEIGHT: 138 LBS

## 2024-08-01 DIAGNOSIS — Z86.39 HISTORY OF OBESITY: Primary | ICD-10-CM

## 2024-08-01 PROCEDURE — 99212 OFFICE O/P EST SF 10 MIN: CPT | Mod: 95 | Performed by: PHYSICIAN ASSISTANT

## 2024-08-01 RX ORDER — SEMAGLUTIDE 1.7 MG/.75ML
1.7 INJECTION, SOLUTION SUBCUTANEOUS WEEKLY
Qty: 3 ML | Refills: 5 | Status: SHIPPED | OUTPATIENT
Start: 2024-08-01

## 2024-08-01 NOTE — ASSESSMENT & PLAN NOTE
Patient was congratulated on wt loss success thus far. Healthy habits to assist with further weight loss were discussed. Luba will continue Wegovy titration.

## 2024-08-01 NOTE — PATIENT INSTRUCTIONS
Nice to talk with you today. Below is the plan discussed.-  Keira Hunter, PARichardC     Pt Instructions:  Increase to 1.7 mg Wegovy once weekly.    Goals:  I recommend eating breakfast within an hour of waking up and eating every 4-6 hours during the day and try minimize snacking between meals. Prioritizing veggies and proteins for food choices is also recommended as medically appropriate.  Great job with exercising - please continue to invest in yourself with regular exercise. Continue to strive for a range for 150-300 minutes of exercise for weight maintenance and incorporating strength training twice a week to help preserve muscle!      Follow up:    Call [phone] to schedule next visit in next available. Be in touch on [facility] for refills/concerns between visits

## 2024-09-07 ENCOUNTER — HEALTH MAINTENANCE LETTER (OUTPATIENT)
Age: 39
End: 2024-09-07

## 2024-11-19 ENCOUNTER — LAB (OUTPATIENT)
Dept: LAB | Facility: CLINIC | Age: 39
End: 2024-11-19
Payer: COMMERCIAL

## 2024-11-19 DIAGNOSIS — E06.3 AUTOIMMUNE THYROIDITIS: ICD-10-CM

## 2024-11-19 DIAGNOSIS — R53.83 OTHER FATIGUE: ICD-10-CM

## 2024-11-19 LAB
EST. AVERAGE GLUCOSE BLD GHB EST-MCNC: 103 MG/DL
FOLATE SERPL-MCNC: 15.5 NG/ML (ref 4.6–34.8)
HBA1C MFR BLD: 5.2 % (ref 0–5.6)

## 2024-11-19 PROCEDURE — 36415 COLL VENOUS BLD VENIPUNCTURE: CPT

## 2024-11-19 PROCEDURE — 86800 THYROGLOBULIN ANTIBODY: CPT | Performed by: INTERNAL MEDICINE

## 2024-11-19 PROCEDURE — 84481 FREE ASSAY (FT-3): CPT

## 2024-11-19 PROCEDURE — 83036 HEMOGLOBIN GLYCOSYLATED A1C: CPT

## 2024-11-19 PROCEDURE — 83695 ASSAY OF LIPOPROTEIN(A): CPT

## 2024-11-19 PROCEDURE — 84207 ASSAY OF VITAMIN B-6: CPT | Mod: 90

## 2024-11-19 PROCEDURE — 80061 LIPID PANEL: CPT

## 2024-11-19 PROCEDURE — 84443 ASSAY THYROID STIM HORMONE: CPT

## 2024-11-19 PROCEDURE — 82607 VITAMIN B-12: CPT

## 2024-11-19 PROCEDURE — 80053 COMPREHEN METABOLIC PANEL: CPT

## 2024-11-19 PROCEDURE — 82746 ASSAY OF FOLIC ACID SERUM: CPT

## 2024-11-19 PROCEDURE — 86376 MICROSOMAL ANTIBODY EACH: CPT | Performed by: INTERNAL MEDICINE

## 2024-11-19 PROCEDURE — 84439 ASSAY OF FREE THYROXINE: CPT

## 2024-11-19 PROCEDURE — 99000 SPECIMEN HANDLING OFFICE-LAB: CPT

## 2024-11-19 PROCEDURE — 84432 ASSAY OF THYROGLOBULIN: CPT | Mod: 90 | Performed by: INTERNAL MEDICINE

## 2024-11-20 LAB
ALBUMIN SERPL BCG-MCNC: 3.9 G/DL (ref 3.5–5.2)
ALP SERPL-CCNC: 63 U/L (ref 40–150)
ALT SERPL W P-5'-P-CCNC: 22 U/L (ref 0–50)
ANION GAP SERPL CALCULATED.3IONS-SCNC: 5 MMOL/L (ref 7–15)
APO A-I SERPL-MCNC: 44 MG/DL
AST SERPL W P-5'-P-CCNC: 22 U/L (ref 0–45)
BILIRUB SERPL-MCNC: 0.4 MG/DL
BUN SERPL-MCNC: 11.3 MG/DL (ref 6–20)
CALCIUM SERPL-MCNC: 8.9 MG/DL (ref 8.8–10.4)
CHLORIDE SERPL-SCNC: 103 MMOL/L (ref 98–107)
CHOLEST SERPL-MCNC: 218 MG/DL
CREAT SERPL-MCNC: 0.85 MG/DL (ref 0.51–0.95)
EGFRCR SERPLBLD CKD-EPI 2021: 88 ML/MIN/1.73M2
FASTING STATUS PATIENT QL REPORTED: NO
FASTING STATUS PATIENT QL REPORTED: NO
GLUCOSE SERPL-MCNC: 95 MG/DL (ref 70–99)
HCO3 SERPL-SCNC: 30 MMOL/L (ref 22–29)
HDLC SERPL-MCNC: 58 MG/DL
NONHDLC SERPL-MCNC: 160 MG/DL
POTASSIUM SERPL-SCNC: 4 MMOL/L (ref 3.4–5.3)
PROT SERPL-MCNC: 6.3 G/DL (ref 6.4–8.3)
SODIUM SERPL-SCNC: 138 MMOL/L (ref 135–145)
T3FREE SERPL-MCNC: 3.3 PG/ML (ref 2–4.4)
T4 FREE SERPL-MCNC: 1.35 NG/DL (ref 0.9–1.7)
THYROGLOB AB SERPL IA-ACNC: 52 IU/ML
THYROPEROXIDASE AB SERPL-ACNC: 33 IU/ML
TRIGL SERPL-MCNC: 94 MG/DL
TSH SERPL DL<=0.005 MIU/L-ACNC: 0.22 UIU/ML (ref 0.3–4.2)
VIT B12 SERPL-MCNC: 444 PG/ML (ref 232–1245)

## 2024-12-04 LAB — SCANNED LAB RESULT: ABNORMAL

## 2025-01-09 LAB — THYROGLOB SERPL-MCNC: 4.2 NG/ML

## 2025-03-30 ENCOUNTER — HEALTH MAINTENANCE LETTER (OUTPATIENT)
Age: 40
End: 2025-03-30

## 2025-04-01 ENCOUNTER — VIRTUAL VISIT (OUTPATIENT)
Dept: SURGERY | Facility: CLINIC | Age: 40
End: 2025-04-01
Payer: COMMERCIAL

## 2025-04-01 VITALS — HEIGHT: 64 IN | BODY MASS INDEX: 28 KG/M2 | WEIGHT: 164 LBS

## 2025-04-01 DIAGNOSIS — Z86.39 HISTORY OF OBESITY: Primary | ICD-10-CM

## 2025-04-01 DIAGNOSIS — E66.3 OVERWEIGHT (BMI 25.0-29.9): ICD-10-CM

## 2025-04-01 NOTE — ASSESSMENT & PLAN NOTE
Body mass index is 28.15 kg/m .   Current Weight: 164 lb (74.4 kg)     Initial Weight (lbs): 179 lbs  Cumulative weight loss (lbs): 15  Weight Loss Percentage: 8.38%

## 2025-06-02 ENCOUNTER — LAB REQUISITION (OUTPATIENT)
Dept: LAB | Facility: CLINIC | Age: 40
End: 2025-06-02
Payer: COMMERCIAL

## 2025-06-02 DIAGNOSIS — Z12.4 ENCOUNTER FOR SCREENING FOR MALIGNANT NEOPLASM OF CERVIX: ICD-10-CM

## 2025-06-02 PROCEDURE — G0145 SCR C/V CYTO,THINLAYER,RESCR: HCPCS | Mod: ORL | Performed by: OBSTETRICS & GYNECOLOGY

## 2025-06-02 PROCEDURE — 87624 HPV HI-RISK TYP POOLED RSLT: CPT | Mod: ORL | Performed by: OBSTETRICS & GYNECOLOGY

## 2025-06-02 PROCEDURE — 87624 HPV HI-RISK TYP POOLED RSLT: CPT | Performed by: OBSTETRICS & GYNECOLOGY

## 2025-06-03 LAB
HPV HR 12 DNA CVX QL NAA+PROBE: NEGATIVE
HPV16 DNA CVX QL NAA+PROBE: NEGATIVE
HPV18 DNA CVX QL NAA+PROBE: NEGATIVE
HUMAN PAPILLOMA VIRUS FINAL DIAGNOSIS: NORMAL

## 2025-06-05 LAB
BKR AP ASSOCIATED HPV REPORT: NORMAL
BKR LAB AP GYN ADEQUACY: NORMAL
BKR LAB AP GYN INTERPRETATION: NORMAL
BKR LAB AP LMP: NORMAL
BKR LAB AP PREVIOUS ABNL DX: NORMAL
BKR LAB AP PREVIOUS ABNORMAL: NORMAL
PATH REPORT.COMMENTS IMP SPEC: NORMAL
PATH REPORT.COMMENTS IMP SPEC: NORMAL
PATH REPORT.RELEVANT HX SPEC: NORMAL

## 2025-07-01 ENCOUNTER — MYC REFILL (OUTPATIENT)
Dept: SURGERY | Facility: CLINIC | Age: 40
End: 2025-07-01
Payer: COMMERCIAL

## 2025-07-01 DIAGNOSIS — Z86.39 HISTORY OF OBESITY: ICD-10-CM

## 2025-07-01 DIAGNOSIS — E66.3 OVERWEIGHT (BMI 25.0-29.9): ICD-10-CM
